# Patient Record
Sex: MALE | Race: WHITE | NOT HISPANIC OR LATINO | URBAN - METROPOLITAN AREA
[De-identification: names, ages, dates, MRNs, and addresses within clinical notes are randomized per-mention and may not be internally consistent; named-entity substitution may affect disease eponyms.]

---

## 2017-06-22 ENCOUNTER — EMERGENCY (EMERGENCY)
Facility: HOSPITAL | Age: 82
LOS: 1 days | Discharge: HOME | End: 2017-06-22
Attending: EMERGENCY MEDICINE
Payer: MEDICARE

## 2017-06-22 VITALS
DIASTOLIC BLOOD PRESSURE: 100 MMHG | HEIGHT: 67 IN | WEIGHT: 145.06 LBS | RESPIRATION RATE: 18 BRPM | OXYGEN SATURATION: 96 % | HEART RATE: 105 BPM | SYSTOLIC BLOOD PRESSURE: 171 MMHG | TEMPERATURE: 98 F

## 2017-06-22 VITALS
DIASTOLIC BLOOD PRESSURE: 95 MMHG | HEART RATE: 105 BPM | SYSTOLIC BLOOD PRESSURE: 149 MMHG | RESPIRATION RATE: 17 BRPM | TEMPERATURE: 98 F | OXYGEN SATURATION: 98 %

## 2017-06-22 DIAGNOSIS — D64.9 ANEMIA, UNSPECIFIED: ICD-10-CM

## 2017-06-22 DIAGNOSIS — I69.351 HEMIPLEGIA AND HEMIPARESIS FOLLOWING CEREBRAL INFARCTION AFFECTING RIGHT DOMINANT SIDE: ICD-10-CM

## 2017-06-22 DIAGNOSIS — Z87.891 PERSONAL HISTORY OF NICOTINE DEPENDENCE: ICD-10-CM

## 2017-06-22 DIAGNOSIS — S51.011A LACERATION WITHOUT FOREIGN BODY OF RIGHT ELBOW, INITIAL ENCOUNTER: ICD-10-CM

## 2017-06-22 DIAGNOSIS — Z79.01 LONG TERM (CURRENT) USE OF ANTICOAGULANTS: ICD-10-CM
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
## 2017-06-22 DIAGNOSIS — I69.320 APHASIA FOLLOWING CEREBRAL INFARCTION: ICD-10-CM
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
## 2017-06-22 LAB
ALBUMIN SERPL ELPH-MCNC: 3 G/DL — LOW (ref 3.5–5)
ALP SERPL-CCNC: 69 U/L — SIGNIFICANT CHANGE UP (ref 40–120)
ALT FLD-CCNC: 18 U/L DA — SIGNIFICANT CHANGE UP (ref 10–60)
ANION GAP SERPL CALC-SCNC: 4 MMOL/L — LOW (ref 5–17)
APTT BLD: 46.6 SEC — HIGH (ref 27.5–37.4)
AST SERPL-CCNC: 17 U/L — SIGNIFICANT CHANGE UP (ref 10–40)
BASOPHILS # BLD AUTO: 0 K/UL — SIGNIFICANT CHANGE UP (ref 0–0.2)
BASOPHILS NFR BLD AUTO: 0.8 % — SIGNIFICANT CHANGE UP (ref 0–2)
BILIRUB SERPL-MCNC: 0.4 MG/DL — SIGNIFICANT CHANGE UP (ref 0.2–1.2)
BUN SERPL-MCNC: 26 MG/DL — HIGH (ref 7–18)
CALCIUM SERPL-MCNC: 8.6 MG/DL — SIGNIFICANT CHANGE UP (ref 8.4–10.5)
CHLORIDE SERPL-SCNC: 102 MMOL/L — SIGNIFICANT CHANGE UP (ref 96–108)
CO2 SERPL-SCNC: 30 MMOL/L — SIGNIFICANT CHANGE UP (ref 22–31)
CREAT SERPL-MCNC: 1.81 MG/DL — HIGH (ref 0.5–1.3)
EOSINOPHIL # BLD AUTO: 0.2 K/UL — SIGNIFICANT CHANGE UP (ref 0–0.5)
EOSINOPHIL NFR BLD AUTO: 2.9 % — SIGNIFICANT CHANGE UP (ref 0–6)
GLUCOSE SERPL-MCNC: 219 MG/DL — HIGH (ref 70–99)
HCT VFR BLD CALC: 31 % — LOW (ref 39–50)
HGB BLD-MCNC: 10.2 G/DL — LOW (ref 13–17)
INR BLD: 3.52 RATIO — HIGH (ref 0.88–1.16)
LYMPHOCYTES # BLD AUTO: 1 K/UL — SIGNIFICANT CHANGE UP (ref 1–3.3)
LYMPHOCYTES # BLD AUTO: 16.5 % — SIGNIFICANT CHANGE UP (ref 13–44)
MCHC RBC-ENTMCNC: 28.2 PG — SIGNIFICANT CHANGE UP (ref 27–34)
MCHC RBC-ENTMCNC: 32.8 GM/DL — SIGNIFICANT CHANGE UP (ref 32–36)
MCV RBC AUTO: 86 FL — SIGNIFICANT CHANGE UP (ref 80–100)
MONOCYTES # BLD AUTO: 0.5 K/UL — SIGNIFICANT CHANGE UP (ref 0–0.9)
MONOCYTES NFR BLD AUTO: 8.5 % — SIGNIFICANT CHANGE UP (ref 2–14)
NEUTROPHILS # BLD AUTO: 4.2 K/UL — SIGNIFICANT CHANGE UP (ref 1.8–7.4)
NEUTROPHILS NFR BLD AUTO: 71.3 % — SIGNIFICANT CHANGE UP (ref 43–77)
PLATELET # BLD AUTO: 175 K/UL — SIGNIFICANT CHANGE UP (ref 150–400)
POTASSIUM SERPL-MCNC: 4.4 MMOL/L — SIGNIFICANT CHANGE UP (ref 3.5–5.3)
POTASSIUM SERPL-SCNC: 4.4 MMOL/L — SIGNIFICANT CHANGE UP (ref 3.5–5.3)
PROT SERPL-MCNC: 6.5 G/DL — SIGNIFICANT CHANGE UP (ref 6–8.3)
PROTHROM AB SERPL-ACNC: 39.4 SEC — HIGH (ref 9.8–12.7)
RBC # BLD: 3.6 M/UL — LOW (ref 4.2–5.8)
RBC # FLD: 12.8 % — SIGNIFICANT CHANGE UP (ref 10.3–14.5)
SODIUM SERPL-SCNC: 136 MMOL/L — SIGNIFICANT CHANGE UP (ref 135–145)
WBC # BLD: 5.9 K/UL — SIGNIFICANT CHANGE UP (ref 3.8–10.5)
WBC # FLD AUTO: 5.9 K/UL — SIGNIFICANT CHANGE UP (ref 3.8–10.5)

## 2017-06-22 PROCEDURE — 99283 EMERGENCY DEPT VISIT LOW MDM: CPT

## 2017-06-22 PROCEDURE — 36415 COLL VENOUS BLD VENIPUNCTURE: CPT

## 2017-06-22 PROCEDURE — 85730 THROMBOPLASTIN TIME PARTIAL: CPT

## 2017-06-22 PROCEDURE — 85610 PROTHROMBIN TIME: CPT

## 2017-06-22 PROCEDURE — 85027 COMPLETE CBC AUTOMATED: CPT

## 2017-06-22 PROCEDURE — 80053 COMPREHEN METABOLIC PANEL: CPT

## 2017-06-22 NOTE — ED PROVIDER NOTE - MEDICAL DECISION MAKING DETAILS
81m pmhx CVA with R sided weakness, aphasia p/w skin tear during OT session when was grabbed by the arm. pt sustained skin tear to distal R biceps, bleeding profusely due to coumadin. was >3 3 days ago, skipped 2 doses, resumed today. pt non-verbal but family at bedside states pt prone to skin tears, no other medical complaints. on PE, midlly tachycardic, hypertensive, RRR, CTA b/l, 1" dog ear skin tear on R distal bicep, not amenable to suture, slow ooze. will obtain basics with coags, pressure dressing with xeroform

## 2017-06-22 NOTE — ED PROVIDER NOTE - PHYSICAL EXAMINATION
PE: CONSTITUTIONAL: Well appearing, well nourished, in no apparent distress. ENMT: Airway patent, nasal mucosa clear, mouth with normal mucosa. HEAD: NCAT EYES: PERRL, EOMI CARDIAC: RRR, no m/r/g, no pedal edema RESPIRATORY: CTA b/l, no adventitious sounds GI: Abdomen non-distended, non-tender MSK: Spine appears normal, range of motion is not limited, no muscle/joint tenderness NEURO: CNII-XII grossly intact, 1-2/5 strength RUE/RLE, full sensation all extremities, gait not assessed SKIN: 1" dog ear skin tear on R distal bicep, not amenable to suture, slow ooze

## 2017-06-22 NOTE — ED PROVIDER NOTE - OBJECTIVE STATEMENT
81m pmhx CVA with R sided weakness, aphasia p/w skin tear during OT session when was grabbed by the arm. pt sustained skin tear to distal R biceps, bleeding profusely due to coumadin. was >3 3 days ago, skipped 2 doses, resumed today. pt non-verbal but family at bedside states pt prone to skin tears, no other medical complaints.

## 2017-06-22 NOTE — ED PROVIDER NOTE - PROGRESS NOTE DETAILS
pt slightly anemia, INR 3.5. family made aware, recommen pt skips today's dose of coumadin. will d/c home via ambulette. printed results provided.

## 2019-05-29 ENCOUNTER — INPATIENT (INPATIENT)
Facility: HOSPITAL | Age: 84
LOS: 1 days | Discharge: HOME | DRG: 101 | End: 2019-05-31
Attending: INTERNAL MEDICINE | Admitting: INTERNAL MEDICINE
Payer: MEDICARE

## 2019-05-29 VITALS
RESPIRATION RATE: 19 BRPM | DIASTOLIC BLOOD PRESSURE: 72 MMHG | TEMPERATURE: 98 F | HEART RATE: 54 BPM | OXYGEN SATURATION: 96 % | SYSTOLIC BLOOD PRESSURE: 137 MMHG

## 2019-05-29 DIAGNOSIS — R56.9 UNSPECIFIED CONVULSIONS: ICD-10-CM

## 2019-05-29 DIAGNOSIS — Z95.2 PRESENCE OF PROSTHETIC HEART VALVE: ICD-10-CM

## 2019-05-29 DIAGNOSIS — R55 SYNCOPE AND COLLAPSE: ICD-10-CM

## 2019-05-29 DIAGNOSIS — N18.9 CHRONIC KIDNEY DISEASE, UNSPECIFIED: ICD-10-CM

## 2019-05-29 DIAGNOSIS — N40.0 BENIGN PROSTATIC HYPERPLASIA WITHOUT LOWER URINARY TRACT SYMPTOMS: ICD-10-CM

## 2019-05-29 DIAGNOSIS — E78.5 HYPERLIPIDEMIA, UNSPECIFIED: ICD-10-CM

## 2019-05-29 DIAGNOSIS — Z29.9 ENCOUNTER FOR PROPHYLACTIC MEASURES, UNSPECIFIED: ICD-10-CM

## 2019-05-29 DIAGNOSIS — R00.1 BRADYCARDIA, UNSPECIFIED: ICD-10-CM

## 2019-05-29 DIAGNOSIS — I63.9 CEREBRAL INFARCTION, UNSPECIFIED: ICD-10-CM

## 2019-05-29 DIAGNOSIS — E11.65 TYPE 2 DIABETES MELLITUS WITH HYPERGLYCEMIA: ICD-10-CM

## 2019-05-29 LAB
ALBUMIN SERPL ELPH-MCNC: 3.3 G/DL — LOW (ref 3.5–5)
ALP SERPL-CCNC: 59 U/L — SIGNIFICANT CHANGE UP (ref 40–120)
ALT FLD-CCNC: 20 U/L DA — SIGNIFICANT CHANGE UP (ref 10–60)
ANION GAP SERPL CALC-SCNC: 8 MMOL/L — SIGNIFICANT CHANGE UP (ref 5–17)
AST SERPL-CCNC: 19 U/L — SIGNIFICANT CHANGE UP (ref 10–40)
BASOPHILS # BLD AUTO: 0.03 K/UL — SIGNIFICANT CHANGE UP (ref 0–0.2)
BASOPHILS NFR BLD AUTO: 0.5 % — SIGNIFICANT CHANGE UP (ref 0–2)
BILIRUB SERPL-MCNC: 0.3 MG/DL — SIGNIFICANT CHANGE UP (ref 0.2–1.2)
BUN SERPL-MCNC: 57 MG/DL — HIGH (ref 7–18)
CALCIUM SERPL-MCNC: 8.6 MG/DL — SIGNIFICANT CHANGE UP (ref 8.4–10.5)
CHLORIDE SERPL-SCNC: 104 MMOL/L — SIGNIFICANT CHANGE UP (ref 96–108)
CO2 SERPL-SCNC: 23 MMOL/L — SIGNIFICANT CHANGE UP (ref 22–31)
CREAT SERPL-MCNC: 2.76 MG/DL — HIGH (ref 0.5–1.3)
EOSINOPHIL # BLD AUTO: 0.18 K/UL — SIGNIFICANT CHANGE UP (ref 0–0.5)
EOSINOPHIL NFR BLD AUTO: 2.8 % — SIGNIFICANT CHANGE UP (ref 0–6)
GLUCOSE SERPL-MCNC: 286 MG/DL — HIGH (ref 70–99)
HCT VFR BLD CALC: 36.6 % — LOW (ref 39–50)
HGB BLD-MCNC: 11.4 G/DL — LOW (ref 13–17)
IMM GRANULOCYTES NFR BLD AUTO: 0.3 % — SIGNIFICANT CHANGE UP (ref 0–1.5)
LACTATE SERPL-SCNC: 1.8 MMOL/L — SIGNIFICANT CHANGE UP (ref 0.7–2)
LYMPHOCYTES # BLD AUTO: 1.36 K/UL — SIGNIFICANT CHANGE UP (ref 1–3.3)
LYMPHOCYTES # BLD AUTO: 21.4 % — SIGNIFICANT CHANGE UP (ref 13–44)
MCHC RBC-ENTMCNC: 27.3 PG — SIGNIFICANT CHANGE UP (ref 27–34)
MCHC RBC-ENTMCNC: 31.1 GM/DL — LOW (ref 32–36)
MCV RBC AUTO: 87.6 FL — SIGNIFICANT CHANGE UP (ref 80–100)
MONOCYTES # BLD AUTO: 0.79 K/UL — SIGNIFICANT CHANGE UP (ref 0–0.9)
MONOCYTES NFR BLD AUTO: 12.4 % — SIGNIFICANT CHANGE UP (ref 2–14)
NEUTROPHILS # BLD AUTO: 3.98 K/UL — SIGNIFICANT CHANGE UP (ref 1.8–7.4)
NEUTROPHILS NFR BLD AUTO: 62.6 % — SIGNIFICANT CHANGE UP (ref 43–77)
NRBC # BLD: 0 /100 WBCS — SIGNIFICANT CHANGE UP (ref 0–0)
PLATELET # BLD AUTO: 182 K/UL — SIGNIFICANT CHANGE UP (ref 150–400)
POTASSIUM SERPL-MCNC: 4.9 MMOL/L — SIGNIFICANT CHANGE UP (ref 3.5–5.3)
POTASSIUM SERPL-SCNC: 4.9 MMOL/L — SIGNIFICANT CHANGE UP (ref 3.5–5.3)
PROT SERPL-MCNC: 7.6 G/DL — SIGNIFICANT CHANGE UP (ref 6–8.3)
RBC # BLD: 4.18 M/UL — LOW (ref 4.2–5.8)
RBC # FLD: 13 % — SIGNIFICANT CHANGE UP (ref 10.3–14.5)
SODIUM SERPL-SCNC: 135 MMOL/L — SIGNIFICANT CHANGE UP (ref 135–145)
T4 FREE+ TSH PNL SERPL: 1.21 UU/ML — SIGNIFICANT CHANGE UP (ref 0.34–4.82)
TROPONIN I SERPL-MCNC: <0.015 NG/ML — SIGNIFICANT CHANGE UP (ref 0–0.04)
WBC # BLD: 6.36 K/UL — SIGNIFICANT CHANGE UP (ref 3.8–10.5)
WBC # FLD AUTO: 6.36 K/UL — SIGNIFICANT CHANGE UP (ref 3.8–10.5)

## 2019-05-29 PROCEDURE — 99285 EMERGENCY DEPT VISIT HI MDM: CPT

## 2019-05-29 PROCEDURE — 93010 ELECTROCARDIOGRAM REPORT: CPT

## 2019-05-29 RX ORDER — SODIUM CHLORIDE 9 MG/ML
500 INJECTION INTRAMUSCULAR; INTRAVENOUS; SUBCUTANEOUS ONCE
Refills: 0 | Status: COMPLETED | OUTPATIENT
Start: 2019-05-29 | End: 2019-05-29

## 2019-05-29 RX ORDER — TAMSULOSIN HYDROCHLORIDE 0.4 MG/1
0.4 CAPSULE ORAL AT BEDTIME
Refills: 0 | Status: DISCONTINUED | OUTPATIENT
Start: 2019-05-29 | End: 2019-05-31

## 2019-05-29 RX ORDER — FINASTERIDE 5 MG/1
5 TABLET, FILM COATED ORAL DAILY
Refills: 0 | Status: DISCONTINUED | OUTPATIENT
Start: 2019-05-29 | End: 2019-05-31

## 2019-05-29 RX ORDER — ATORVASTATIN CALCIUM 80 MG/1
40 TABLET, FILM COATED ORAL AT BEDTIME
Refills: 0 | Status: DISCONTINUED | OUTPATIENT
Start: 2019-05-29 | End: 2019-05-31

## 2019-05-29 RX ORDER — ASPIRIN/CALCIUM CARB/MAGNESIUM 324 MG
81 TABLET ORAL DAILY
Refills: 0 | Status: DISCONTINUED | OUTPATIENT
Start: 2019-05-29 | End: 2019-05-31

## 2019-05-29 RX ORDER — FENOFIBRATE,MICRONIZED 130 MG
48 CAPSULE ORAL DAILY
Refills: 0 | Status: DISCONTINUED | OUTPATIENT
Start: 2019-05-29 | End: 2019-05-31

## 2019-05-29 RX ORDER — LEVETIRACETAM 250 MG/1
250 TABLET, FILM COATED ORAL
Refills: 0 | Status: DISCONTINUED | OUTPATIENT
Start: 2019-05-29 | End: 2019-05-30

## 2019-05-29 RX ORDER — INSULIN LISPRO 100/ML
VIAL (ML) SUBCUTANEOUS
Refills: 0 | Status: DISCONTINUED | OUTPATIENT
Start: 2019-05-29 | End: 2019-05-31

## 2019-05-29 RX ORDER — INSULIN GLARGINE 100 [IU]/ML
10 INJECTION, SOLUTION SUBCUTANEOUS AT BEDTIME
Refills: 0 | Status: DISCONTINUED | OUTPATIENT
Start: 2019-05-29 | End: 2019-05-31

## 2019-05-29 RX ADMIN — SODIUM CHLORIDE 500 MILLILITER(S): 9 INJECTION INTRAMUSCULAR; INTRAVENOUS; SUBCUTANEOUS at 22:45

## 2019-05-29 RX ADMIN — SODIUM CHLORIDE 500 MILLILITER(S): 9 INJECTION INTRAMUSCULAR; INTRAVENOUS; SUBCUTANEOUS at 21:00

## 2019-05-29 NOTE — ED ADULT TRIAGE NOTE - CHIEF COMPLAINT QUOTE
biba for AMS ,beacame unresponsive as per wife during the physical therapy,pt was drooling, BP was low on the 80"s and kalia as per ems,,pt awake during triage pt .non verbal had cva 2 x

## 2019-05-29 NOTE — H&P ADULT - PROBLEM SELECTOR PLAN 1
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  Pt is on Keppra 125mg bid at home  Resume home dose and Check Keppra levels  Consulted Dr Donovan 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

## 2019-05-29 NOTE — H&P ADULT - PROBLEM SELECTOR PLAN 7
Pt takes Warfarin 4mg for 5 days and 2.5 mg two times per week alternating   Monitor INR  His PMD asked him to hold dose for few days due to neurotherapeutic levels

## 2019-05-29 NOTE — H&P ADULT - PROBLEM SELECTOR PLAN 2
EKG showed sinus kalia with 1 st degree block  Normal cardiac enzymes  Tele monitoring for r/o underlying arrythmia causing syncope  Ordered TTE

## 2019-05-29 NOTE — ED PROVIDER NOTE - CLINICAL SUMMARY MEDICAL DECISION MAKING FREE TEXT BOX
83 yro ld male with hx of CVA x2 right sided paralysis, aphasia, DM, seizure on keppra, CAD stent and open heart bypass presents to ed via ems with family for syncope this evening.  pt was sitting and and usually fall asleep but wife noticed pt drooling and unresponsive for about 5 mins called ems and had low BP and HR.  no change in skin color, no seizure like activity, no trauma. no fever,.  pt was at baseline prior to event.  pt normally is incontinent, compliant with meds.  family noticed today sugars fluctuating high as 400 even with meds.      syncope r/o arrhythmia vs uti vs vasovagal vs breakthrough seizure vs cva.  labs, ekg, cxr, ct head, fluids, admit

## 2019-05-29 NOTE — H&P ADULT - ASSESSMENT
83 Male with PMH of Dementia, CKD, CVA (2015 and 2017), Residual Rt body paralysis, Dysarthria, Wheelchair bound, Seizure disorder, Carotid endarterectomy (2017), Aortic valve replacement (Warfarin) and CABG (2013) came to hospital for unresponsiveness.

## 2019-05-29 NOTE — H&P ADULT - NSICDXPASTMEDICALHX_GEN_ALL_CORE_FT
PAST MEDICAL HISTORY:  BPH (benign prostatic hyperplasia)     CVA (cerebrovascular accident)     Diabetes     HLD (hyperlipidemia)     Seizure

## 2019-05-29 NOTE — H&P ADULT - HISTORY OF PRESENT ILLNESS
83 Male with PMH of Dementia, CKD, CVA (2015 and 2017), Residual Rt body paralysis, Wheelchair bound, Seizure, Carotid endarterectomy (2017), Aortic valve replacement (Warfarin) and CABG (2013) came to hospital for unresponsiveness for 10 mins at home. 83 Male with PMH of Dementia, CKD, CVA (2015 and 2017), Residual Rt body paralysis, Dysarthria, Wheelchair bound, Seizure disorder, Carotid endarterectomy (2017), Aortic valve replacement (Warfarin) and CABG (2013) came to hospital for unresponsiveness for 10 mins at home. Pt has started new insulin regimen yesterday for uncontrolled blood sugars of 200-400. In morning he took 10 units of lantus as prescribed by his PMD. In afternoon, is sugar was 400. After dinner, he was watching TV with family when he dropped his head down and started drooling. He was totally unresponsive. When EMS arrived he was confused and was starring on the wall. Upon arrival in ED, he got back to his baseline and started smiling and talking as before. No fever, new focal weakness, chest pain. Last bowel movement was 2 days ago.     SH: Wheel chair. Has 4 hours of HHA for 4 days a week. Lives with wife.  GOC: Ok for resuscitation but No mechanical ventilation.     ED Course: EKG showed bradycardia of 50 with 1st degree block. Labs showed CKD. Cardiac enzymes were normal. Ct head showed Encephalomalacia and Gliosis in left frontal lobe due to old CVA. 83 Male with PMH of Dementia, CKD, CVA (2015 and 2017), Residual Rt body paralysis, Dysarthria, Wheelchair bound, Seizure disorder, Carotid endarterectomy (2017), Aortic valve replacement (Warfarin) and CABG (2013) came to hospital for unresponsiveness for 10 mins at home. Pt has started new insulin regimen yesterday for uncontrolled blood sugars of 200-400. In morning he took 10 units of lantus as prescribed by his PMD. In afternoon, is sugar was 400. After dinner, he was watching TV with family when he dropped his head down and started drooling. He was totally unresponsive and lost urine. When EMS arrived he was confused and was starring on the wall. Upon arrival in ED, he got back to his baseline and started smiling and talking as before. No fever, new focal weakness, chest pain. Last bowel movement was 2 days ago.     SH: Wheel chair. Has 4 hours of HHA for 4 days a week. Lives with wife.  GOC: Ok for resuscitation but No mechanical ventilation.     ED Course: EKG showed bradycardia of 50 with 1st degree block. Labs showed CKD. Cardiac enzymes were normal. Ct head showed Encephalomalacia and Gliosis in left frontal lobe due to old CVA. 83 Male with PMH of Dementia, CKD, CVA (2015 and 2017), Residual Rt body paralysis, Dysarthria, Wheelchair bound, Seizure disorder, Carotid endarterectomy (2017), Aortic valve replacement (Warfarin) and CABG (2013) came to hospital for unresponsiveness for 10 mins at home. Pt has started new insulin regimen yesterday for uncontrolled blood sugars of 200-400. In morning he took 10 units of lantus as prescribed by his PMD. In afternoon, his sugar was 400. After dinner, he was watching TV with family when he dropped his head down and started drooling. He was totally unresponsive and lost urine. When EMS arrived he was confused and was starring on the wall. Upon arrival in ED, he got back to his baseline and started smiling and talking as before. No fever, new focal weakness, chest pain. Last bowel movement was 2 days ago.     SH: Wheel chair. Has 4 hours of HHA for 4 days a week. Lives with wife.  GOC: Ok for resuscitation but No mechanical ventilation.     ED Course: EKG showed bradycardia of 50 with 1st degree block. Labs showed CKD. Cardiac enzymes were normal. Ct head showed Encephalomalacia and Gliosis in left frontal lobe due to old CVA.

## 2019-05-29 NOTE — ED PROVIDER NOTE - PROGRESS NOTE DETAILS
morgan: stable.  pt work up neg.  ct head neg. cxr no acute findings.  admit to med for syncope nos.

## 2019-05-29 NOTE — ED PROVIDER NOTE - OBJECTIVE STATEMENT
83 yro ld male with hx of CVA x2 right sided paralysis, aphasia, DM, seizure on keppra, CAD stent and open heart bypass presents to ed via ems with family for syncope this evening.  pt was sitting and and usually fall asleep but wife noticed pt drooling and unresponsive for about 5 mins called ems and had low BP and HR.  no change in skin color, no seizure like activity, no trauma. no fever,.  pt was at baseline prior to event.  pt normally is incontinent, compliant with meds.  family noticed today sugars fluctuating high as 400 even with meds.  now at baseline in ed

## 2019-05-29 NOTE — H&P ADULT - NSHPPHYSICALEXAM_GEN_ALL_CORE
Vital Signs Last 24 Hrs  T(C): 36.4 (29 May 2019 20:33), Max: 36.4 (29 May 2019 20:33)  T(F): 97.6 (29 May 2019 20:33), Max: 97.6 (29 May 2019 20:33)  HR: 54 (29 May 2019 20:33) (54 - 54)  BP: 137/72 (29 May 2019 20:33) (137/72 - 137/72)  RR: 19 (29 May 2019 20:33) (19 - 19)  SpO2: 96% (29 May 2019 20:33) (96% - 96%)  .  GENERAL: Well developed, Elderly white male, aphasic   HEENT:  Normocephalic/Atraumatic, reactive light reflex, moist mucous membranes  NECK: Supple, no JVD  RESP: Symmetric movement of the chest, clear to auscultation bilaterally  CVS: S1 and S2 audible, no murmur, rubs or gallops noted  GI: Normal active bowel sounds present, abdomen soft, non tender, non distended  EXTREMITIES:  No edema, no clubbing, cyanosis, Lt knee mauricio, Rt arm ecchymosis   MSK: Rt body paralysis  PSYCH: Normal mood, dementia    NEURO: Alert and oriented x 1

## 2019-05-29 NOTE — H&P ADULT - PROBLEM SELECTOR PLAN 8
Unknown baseline Cr but his PMD stopped Januvia due to worsening renal functions   Monitor BMP  Check urine lytes

## 2019-05-30 LAB
ALBUMIN SERPL ELPH-MCNC: 3 G/DL — LOW (ref 3.5–5)
ALP SERPL-CCNC: 54 U/L — SIGNIFICANT CHANGE UP (ref 40–120)
ALT FLD-CCNC: 18 U/L DA — SIGNIFICANT CHANGE UP (ref 10–60)
ANION GAP SERPL CALC-SCNC: 5 MMOL/L — SIGNIFICANT CHANGE UP (ref 5–17)
APTT BLD: 46.3 SEC — HIGH (ref 27.5–36.3)
AST SERPL-CCNC: 16 U/L — SIGNIFICANT CHANGE UP (ref 10–40)
BASOPHILS # BLD AUTO: 0.03 K/UL — SIGNIFICANT CHANGE UP (ref 0–0.2)
BASOPHILS NFR BLD AUTO: 0.6 % — SIGNIFICANT CHANGE UP (ref 0–2)
BILIRUB SERPL-MCNC: 0.2 MG/DL — SIGNIFICANT CHANGE UP (ref 0.2–1.2)
BUN SERPL-MCNC: 53 MG/DL — HIGH (ref 7–18)
CALCIUM SERPL-MCNC: 8.2 MG/DL — LOW (ref 8.4–10.5)
CHLORIDE SERPL-SCNC: 107 MMOL/L — SIGNIFICANT CHANGE UP (ref 96–108)
CHOLEST SERPL-MCNC: 144 MG/DL — SIGNIFICANT CHANGE UP (ref 10–199)
CO2 SERPL-SCNC: 27 MMOL/L — SIGNIFICANT CHANGE UP (ref 22–31)
CREAT SERPL-MCNC: 2.36 MG/DL — HIGH (ref 0.5–1.3)
EOSINOPHIL # BLD AUTO: 0.18 K/UL — SIGNIFICANT CHANGE UP (ref 0–0.5)
EOSINOPHIL NFR BLD AUTO: 3.5 % — SIGNIFICANT CHANGE UP (ref 0–6)
FOLATE SERPL-MCNC: 8.6 NG/ML — SIGNIFICANT CHANGE UP
GLUCOSE BLDC GLUCOMTR-MCNC: 123 MG/DL — HIGH (ref 70–99)
GLUCOSE BLDC GLUCOMTR-MCNC: 196 MG/DL — HIGH (ref 70–99)
GLUCOSE BLDC GLUCOMTR-MCNC: 230 MG/DL — HIGH (ref 70–99)
GLUCOSE BLDC GLUCOMTR-MCNC: 291 MG/DL — HIGH (ref 70–99)
GLUCOSE SERPL-MCNC: 158 MG/DL — HIGH (ref 70–99)
HBA1C BLD-MCNC: 10.6 % — HIGH (ref 4–5.6)
HCT VFR BLD CALC: 32.5 % — LOW (ref 39–50)
HDLC SERPL-MCNC: 54 MG/DL — SIGNIFICANT CHANGE UP
HGB BLD-MCNC: 10.3 G/DL — LOW (ref 13–17)
IMM GRANULOCYTES NFR BLD AUTO: 0.4 % — SIGNIFICANT CHANGE UP (ref 0–1.5)
INR BLD: 3.58 RATIO — HIGH (ref 0.88–1.16)
LIPID PNL WITH DIRECT LDL SERPL: 75 MG/DL — SIGNIFICANT CHANGE UP
LYMPHOCYTES # BLD AUTO: 1.64 K/UL — SIGNIFICANT CHANGE UP (ref 1–3.3)
LYMPHOCYTES # BLD AUTO: 32.3 % — SIGNIFICANT CHANGE UP (ref 13–44)
MAGNESIUM SERPL-MCNC: 2.3 MG/DL — SIGNIFICANT CHANGE UP (ref 1.6–2.6)
MCHC RBC-ENTMCNC: 27.4 PG — SIGNIFICANT CHANGE UP (ref 27–34)
MCHC RBC-ENTMCNC: 31.7 GM/DL — LOW (ref 32–36)
MCV RBC AUTO: 86.4 FL — SIGNIFICANT CHANGE UP (ref 80–100)
MONOCYTES # BLD AUTO: 0.53 K/UL — SIGNIFICANT CHANGE UP (ref 0–0.9)
MONOCYTES NFR BLD AUTO: 10.4 % — SIGNIFICANT CHANGE UP (ref 2–14)
NEUTROPHILS # BLD AUTO: 2.68 K/UL — SIGNIFICANT CHANGE UP (ref 1.8–7.4)
NEUTROPHILS NFR BLD AUTO: 52.8 % — SIGNIFICANT CHANGE UP (ref 43–77)
NRBC # BLD: 0 /100 WBCS — SIGNIFICANT CHANGE UP (ref 0–0)
PHOSPHATE SERPL-MCNC: 3.3 MG/DL — SIGNIFICANT CHANGE UP (ref 2.5–4.5)
PLATELET # BLD AUTO: 174 K/UL — SIGNIFICANT CHANGE UP (ref 150–400)
POTASSIUM SERPL-MCNC: 4.3 MMOL/L — SIGNIFICANT CHANGE UP (ref 3.5–5.3)
POTASSIUM SERPL-SCNC: 4.3 MMOL/L — SIGNIFICANT CHANGE UP (ref 3.5–5.3)
PROT SERPL-MCNC: 6.7 G/DL — SIGNIFICANT CHANGE UP (ref 6–8.3)
PROTHROM AB SERPL-ACNC: 41.3 SEC — HIGH (ref 10–12.9)
RBC # BLD: 3.76 M/UL — LOW (ref 4.2–5.8)
RBC # FLD: 13 % — SIGNIFICANT CHANGE UP (ref 10.3–14.5)
SODIUM SERPL-SCNC: 139 MMOL/L — SIGNIFICANT CHANGE UP (ref 135–145)
TOTAL CHOLESTEROL/HDL RATIO MEASUREMENT: 2.7 RATIO — LOW (ref 3.4–9.6)
TRIGL SERPL-MCNC: 73 MG/DL — SIGNIFICANT CHANGE UP (ref 10–149)
TSH SERPL-MCNC: 0.75 UU/ML — SIGNIFICANT CHANGE UP (ref 0.34–4.82)
VIT B12 SERPL-MCNC: 579 PG/ML — SIGNIFICANT CHANGE UP (ref 232–1245)
WBC # BLD: 5.08 K/UL — SIGNIFICANT CHANGE UP (ref 3.8–10.5)
WBC # FLD AUTO: 5.08 K/UL — SIGNIFICANT CHANGE UP (ref 3.8–10.5)

## 2019-05-30 PROCEDURE — 99223 1ST HOSP IP/OBS HIGH 75: CPT

## 2019-05-30 RX ORDER — SODIUM CHLORIDE 9 MG/ML
1500 INJECTION INTRAMUSCULAR; INTRAVENOUS; SUBCUTANEOUS
Refills: 0 | Status: DISCONTINUED | OUTPATIENT
Start: 2019-05-30 | End: 2019-05-31

## 2019-05-30 RX ORDER — LEVETIRACETAM 250 MG/1
125 TABLET, FILM COATED ORAL
Refills: 0 | Status: DISCONTINUED | OUTPATIENT
Start: 2019-05-30 | End: 2019-05-31

## 2019-05-30 RX ORDER — LEVETIRACETAM 250 MG/1
1000 TABLET, FILM COATED ORAL ONCE
Refills: 0 | Status: COMPLETED | OUTPATIENT
Start: 2019-05-30 | End: 2019-05-30

## 2019-05-30 RX ADMIN — FINASTERIDE 5 MILLIGRAM(S): 5 TABLET, FILM COATED ORAL at 12:35

## 2019-05-30 RX ADMIN — Medication 3: at 17:41

## 2019-05-30 RX ADMIN — Medication 2: at 22:16

## 2019-05-30 RX ADMIN — INSULIN GLARGINE 10 UNIT(S): 100 INJECTION, SOLUTION SUBCUTANEOUS at 22:16

## 2019-05-30 RX ADMIN — ATORVASTATIN CALCIUM 40 MILLIGRAM(S): 80 TABLET, FILM COATED ORAL at 22:16

## 2019-05-30 RX ADMIN — LEVETIRACETAM 400 MILLIGRAM(S): 250 TABLET, FILM COATED ORAL at 01:24

## 2019-05-30 RX ADMIN — Medication 81 MILLIGRAM(S): at 12:35

## 2019-05-30 RX ADMIN — Medication 48 MILLIGRAM(S): at 12:35

## 2019-05-30 RX ADMIN — Medication 1: at 12:35

## 2019-05-30 RX ADMIN — TAMSULOSIN HYDROCHLORIDE 0.4 MILLIGRAM(S): 0.4 CAPSULE ORAL at 22:16

## 2019-05-30 RX ADMIN — LEVETIRACETAM 125 MILLIGRAM(S): 250 TABLET, FILM COATED ORAL at 06:52

## 2019-05-30 RX ADMIN — LEVETIRACETAM 125 MILLIGRAM(S): 250 TABLET, FILM COATED ORAL at 17:41

## 2019-05-30 RX ADMIN — Medication 20 MILLIGRAM(S): at 12:35

## 2019-05-30 NOTE — CONSULT NOTE ADULT - ATTENDING COMMENTS
Patient seen and examined. Examination is notable for expressive aphasia, motor apraxia of the right upper and lower extremities, and at least 2/5 strength throughout right upper and lower extremities. Patient had previous seizure 4 years ago characterized by convulsions, and left hemispheric stroke resulting in right-sided hemiparesis and aphasia - he underwent left CEA following that.    In addition to above plan, patient is approved for MRI Brain to evaluate for new stroke as an explanation for his presenting symptoms, given his stroke risk factors.    After EEG, levetiracetam dosage should be increased to the minimum effective dose of 500mg PO BID.    Patient's wife and son at bedside have been counseled about the differential diagnosis and plan.

## 2019-05-30 NOTE — CONSULT NOTE ADULT - ASSESSMENT
84yo male w/ LOC concerning for seizure    Recommendation:    -EEG to evaluate for seizure    -Continue current Keppra until results of EEG    -Maintain seizure and fall precautions 84yo male w/ LOC concerning for seizure    Recommendation:    -EEG to evaluate for seizure    -Continue current Keppra until results of EEG    -Maintain seizure and fall precautions    -Continue ASA 81mg PO daily & Atorvastatin 40mg QHS for h/o stroke.  Add DVT ppx.

## 2019-05-30 NOTE — PROGRESS NOTE ADULT - SUBJECTIVE AND OBJECTIVE BOX
PGY 1 Note discussed with supervising resident and primary attending    Patient is a 83y old  Male who presents with a chief complaint of Syncope (30 May 2019 10:16)      INTERVAL HPI/OVERNIGHT EVENTS: No acute events overnight, remains afebrile; HD stable, H/H stable, WBC WNL  -Pt reports no new medical problems  -Hb 11->10  -INR remains supra-therapeutic 3.58, will skip Warfarin today again  -Cr 2.76->2.36  -BUN/Cr > 20, likely pre-renal, possibly from dehydration, started pt on trial of hydration  -F/u US renal (CKD)  -F/u XR abdomen (constipation)  -F/u TTE  -F/u PT  -F/u S&S  -F/u EEG per Neuro    MEDICATIONS  (STANDING):  aspirin  chewable 81 milliGRAM(s) Oral daily  atorvastatin 40 milliGRAM(s) Oral at bedtime  fenofibrate Tablet 48 milliGRAM(s) Oral daily  finasteride 5 milliGRAM(s) Oral daily  insulin glargine Injectable (LANTUS) 10 Unit(s) SubCutaneous at bedtime  insulin lispro (HumaLOG) corrective regimen sliding scale   SubCutaneous Before meals and at bedtime  levETIRAcetam 125 milliGRAM(s) Oral two times a day  PARoxetine 20 milliGRAM(s) Oral daily  sodium chloride 0.9%. 1500 milliLiter(s) (60 mL/Hr) IV Continuous <Continuous>  tamsulosin 0.4 milliGRAM(s) Oral at bedtime    MEDICATIONS  (PRN):      __________________________________________________  REVIEW OF SYSTEMS:    Unable to obtain secondary to patient's mentation      Vital Signs Last 24 Hrs  T(C): 36.2 (30 May 2019 05:30), Max: 37 (29 May 2019 22:20)  T(F): 97.1 (30 May 2019 05:30), Max: 98.6 (29 May 2019 22:20)  HR: 60 (30 May 2019 05:30) (54 - 69)  BP: 111/56 (30 May 2019 05:30) (111/56 - 137/72)  BP(mean): --  RR: 18 (30 May 2019 05:30) (18 - 19)  SpO2: 100% (30 May 2019 05:30) (96% - 100%)    ________________________________________________  PHYSICAL EXAM:    GENERAL: NAD  HEENT: Normocephalic;  conjunctivae and sclerae clear; moist mucous membranes;   NECK : supple  CHEST/LUNG: Clear to auscultation bilaterally with good air entry   HEART: S1 S2  regular; no murmurs, gallops or rubs  ABDOMEN: Soft, Nontender, Nondistended; Bowel sounds present  EXTREMITIES: No cyanosis; no edema; no calf tenderness  SKIN: Warm and dry; no rash  NERVOUS SYSTEM:  Awake and alert; no new deficits; residual right sided paralysis s/p CVA in 2017    _________________________________________________  LABS:                        10.3   5.08  )-----------( 174      ( 30 May 2019 06:13 )             32.5     05-30    139  |  107  |  53<H>  ----------------------------<  158<H>  4.3   |  27  |  2.36<H>    Ca    8.2<L>      30 May 2019 06:13  Phos  3.3     05-30  Mg     2.3     05-30    TPro  6.7  /  Alb  3.0<L>  /  TBili  0.2  /  DBili  x   /  AST  16  /  ALT  18  /  AlkPhos  54  05-30    PT/INR - ( 30 May 2019 06:13 )   PT: 41.3 sec;   INR: 3.58 ratio         PTT - ( 30 May 2019 06:13 )  PTT:46.3 sec    CAPILLARY BLOOD GLUCOSE      POCT Blood Glucose.: 196 mg/dL (30 May 2019 12:16)  POCT Blood Glucose.: 123 mg/dL (30 May 2019 07:58)  POCT Blood Glucose.: 318 mg/dL (29 May 2019 20:48)        RADIOLOGY & ADDITIONAL TESTS:    Imaging Personally Reviewed:  YES    Consultant(s) Notes Reviewed:   YES    Care Discussed with Consultants :     Plan of care was discussed with patient and /or primary care giver; all questions and concerns were addressed and care was aligned with patient's wishes.

## 2019-05-30 NOTE — ED ADULT NURSE NOTE - ED STAT RN HANDOFF DETAILS
pt,remained   stable.denies  pain. transfer to rm 521 report given to obdulio segal.not  in distress

## 2019-05-30 NOTE — ED ADULT NURSE NOTE - OBJECTIVE STATEMENT
brought in by ems from home s/p syncope episode pt. fall asleep noted drooling then unresponsive for 5 min.hx of CVA x2 right sided paralysis, aphasia, DM, seizure on keppra, ,cad.  bld.drawn and   sent to lab

## 2019-05-30 NOTE — CONSULT NOTE ADULT - SUBJECTIVE AND OBJECTIVE BOX
+++++++++++++++++++++++NOTE NOT COMPLETED+++++++++++++++++++++++++++++++++++++++++  Patient is a 83y old  Male who presents with a chief complaint of Syncope (29 May 2019 23:29)      HPI:  83 Male with PMH of Dementia, CKD, CVA (2015 and 2017), Residual Rt body paralysis, Dysarthria, Wheelchair bound, Seizure disorder, Carotid endarterectomy (2017), Aortic valve replacement (Warfarin) and CABG (2013) came to hospital for unresponsiveness for 10 mins at home. Pt has started new insulin regimen yesterday for uncontrolled blood sugars of 200-400. In morning he took 10 units of lantus as prescribed by his PMD. In afternoon, his sugar was 400. After dinner, he was watching TV with family when he dropped his head down and started drooling. He was totally unresponsive and lost urine. When EMS arrived he was confused and was starring on the wall. Upon arrival in ED, he got back to his baseline and started smiling and talking as before. No fever, new focal weakness, chest pain. Last bowel movement was 2 days ago.  Son reports pt's nonverbal, wheelchair bound, able to use left arm to feed himself and swallows w/o difficulty.      SH: Wheel chair. Has 4 hours of HHA for 4 days a week. Lives with wife.  GOC: Ok for resuscitation but No mechanical ventilation.     ED Course: EKG showed bradycardia of 50 with 1st degree block. Labs showed CKD. Cardiac enzymes were normal. Ct head showed Encephalomalacia and Gliosis in left frontal lobe due to old CVA. (29 May 2019 23:29)         Neurological Review of Systems:  No difficulty with language.  No vision loss or double vision.  No dizziness, vertigo or new hearing loss.  No difficulty with speech or swallowing.  No focal weakness.  No focal sensory changes.  No numbness or tingling in the bilateral lower extremities.  No difficulty with balance.  No difficulty with ambulation.        MEDICATIONS  (STANDING):  aspirin  chewable 81 milliGRAM(s) Oral daily  atorvastatin 40 milliGRAM(s) Oral at bedtime  fenofibrate Tablet 48 milliGRAM(s) Oral daily  finasteride 5 milliGRAM(s) Oral daily  insulin glargine Injectable (LANTUS) 10 Unit(s) SubCutaneous at bedtime  insulin lispro (HumaLOG) corrective regimen sliding scale   SubCutaneous Before meals and at bedtime  levETIRAcetam 125 milliGRAM(s) Oral two times a day  PARoxetine 20 milliGRAM(s) Oral daily  tamsulosin 0.4 milliGRAM(s) Oral at bedtime    MEDICATIONS  (PRN):    Allergies    No Known Allergies    Intolerances      PAST MEDICAL & SURGICAL HISTORY:  HLD (hyperlipidemia)  Seizure  Diabetes  BPH (benign prostatic hyperplasia)  CVA (cerebrovascular accident)    FAMILY HISTORY:    SOCIAL HISTORY: non smoker/ former smoker/ active smoker    Review of Systems:  Constitutional: No generalized weakness. No fevers or chills.                    Eyes, Ears, Mouth, Throat: No vision loss   Respiratory: No shortness of breath or cough.                                Cardiovascular: No chest pain or palpitations  Gastrointestinal: No nausea or vomiting.                                         Genitourinary: No urinary incontinence or burning on urination.  Musculoskeletal: No joint pain.                                                           Dermatologic: No rash.  Neurological: as per HPI                                                                      Psychiatric: No behavioral problems.  Endocrine: No known hypoglycemia.               Hematologic/Lymphatic: No easy bleeding.    O:  Vital Signs Last 24 Hrs  T(C): 36.2 (30 May 2019 05:30), Max: 37 (29 May 2019 22:20)  T(F): 97.1 (30 May 2019 05:30), Max: 98.6 (29 May 2019 22:20)  HR: 60 (30 May 2019 05:30) (54 - 69)  BP: 111/56 (30 May 2019 05:30) (111/56 - 137/72)  BP(mean): --  RR: 18 (30 May 2019 05:30) (18 - 19)  SpO2: 100% (30 May 2019 05:30) (96% - 100%)    General Exam:   General appearance: No acute distress                 Cardiovascular: Pedal dorsalis pulses intact bilaterally    Mental Status: Orientated to self, date and place.  Attention intact.  No dysarthria, aphasia or neglect.  Knowledge intact.  Registration intact.  Short and long term memory grossly intact.      Cranial Nerves: CN I - not tested.  PERRL, EOMI, VFF, no nystagmus or diplopia.  No APD.  Fundi not visualized.  CN V1-3 intact to light touch and pinprick.  No facial asymmetry.  Hearing intact to finger rub bilaterally.  Tongue, uvula and palate midline.  Sternocleidomastoid and Trapezius intact bilaterally.    Motor:   Tone: normal.                  Strength intact throughout  No pronator drift bilaterally                      No dysmetria on finger-nose-finger or heel-shin-heel  No truncal ataxia.  No resting, postural or action tremor.  No myoclonus.    Sensation: intact to light touch, pinprick, vibration and proprioception    Deep Tendon Reflexes: 1+ bilateral biceps, triceps, brachioradialis, knee and ankle  Toes flexor bilaterally    Gait: normal and stable.  Rhomberg -maile.    Other:     LABS:                        10.3   5.08  )-----------( 174      ( 30 May 2019 06:13 )             32.5     05-30    139  |  107  |  53<H>  ----------------------------<  158<H>  4.3   |  27  |  2.36<H>    Ca    8.2<L>      30 May 2019 06:13  Phos  3.3     05-30  Mg     2.3     05-30    TPro  6.7  /  Alb  3.0<L>  /  TBili  0.2  /  DBili  x   /  AST  16  /  ALT  18  /  AlkPhos  54  05-30    PT/INR - ( 30 May 2019 06:13 )   PT: 41.3 sec;   INR: 3.58 ratio         PTT - ( 30 May 2019 06:13 )  PTT:46.3 sec        RADIOLOGY & ADDITIONAL STUDIES:    EKG:  tele:  TTE:  EEG: +++++++++++++++++++++++NOTE NOT COMPLETED+++++++++++++++++++++++++++++++++++++++++  Patient is a 83y old  Male who presents with a chief complaint of Syncope (29 May 2019 23:29)      HPI:  83 Male with PMH of Dementia, CKD, CVA (2015 and 2017), Residual Rt body paralysis, Dysarthria, Wheelchair bound, Seizure disorder, Carotid endarterectomy (2017), Aortic valve replacement (Warfarin) and CABG (2013) came to hospital for unresponsiveness for 10 mins at home. Pt has started new insulin regimen yesterday for uncontrolled blood sugars of 200-400. In morning he took 10 units of lantus as prescribed by his PMD. In afternoon, his sugar was 400. After dinner, he was watching TV with family when he dropped his head down and started drooling. He was totally unresponsive and lost urine. When EMS arrived he was confused and was starring on the wall. Upon arrival in ED, he got back to his baseline and started smiling and talking as before. No fever, new focal weakness, chest pain. Last bowel movement was 2 days ago.  Son reports pt's nonverbal, wheelchair bound, able to use left arm to feed himself and swallows w/o difficulty.  Communicates by shaking head, or gesturing.  Last night approx 7 or 8PM pt had finished dinner and was sitting in wheelchair watching TV.  Approx 20min had passed after dinner and the pt's family noticed his head was slumped and he as drooling.  Pt was unresponsive for 5-10min until Paramedics arrived and placed an oxygen mask.  Son reports pt had wet himself but pt is incontinent at baseline.  Denies tongue biting or shaking.  Of note, son reports yesterday was the first day pt started using insulin.      SH: Wheel chair. Has 4 hours of HHA for 4 days a week. Lives with wife.  GOC: Ok for resuscitation but No mechanical ventilation.     ED Course: EKG showed bradycardia of 50 with 1st degree block. Labs showed CKD. Cardiac enzymes were normal. Ct head showed Encephalomalacia and Gliosis in left frontal lobe due to old CVA. (29 May 2019 23:29)         Neurological Review of Systems:  (+) Difficulty with language.  No vision loss or double vision.  No dizziness, vertigo or new hearing loss.  (+) Difficulty with speech.  No difficulty w/ swallowing.  (+) Right focal weakness. (+) Right focal sensory changes.  No numbness or tingling in the bilateral lower extremities.  Wheelchair bound.        MEDICATIONS  (STANDING):  aspirin  chewable 81 milliGRAM(s) Oral daily  atorvastatin 40 milliGRAM(s) Oral at bedtime  fenofibrate Tablet 48 milliGRAM(s) Oral daily  finasteride 5 milliGRAM(s) Oral daily  insulin glargine Injectable (LANTUS) 10 Unit(s) SubCutaneous at bedtime  insulin lispro (HumaLOG) corrective regimen sliding scale   SubCutaneous Before meals and at bedtime  levETIRAcetam 125 milliGRAM(s) Oral two times a day  PARoxetine 20 milliGRAM(s) Oral daily  tamsulosin 0.4 milliGRAM(s) Oral at bedtime    MEDICATIONS  (PRN):    Allergies    No Known Allergies    Intolerances      PAST MEDICAL & SURGICAL HISTORY:  HLD (hyperlipidemia)  Seizure  Diabetes  BPH (benign prostatic hyperplasia)  CVA (cerebrovascular accident)    FAMILY HISTORY:    SOCIAL HISTORY: non smoker    Review of Systems:  Constitutional: No generalized weakness. No fevers or chills                  Eyes, Ears, Mouth, Throat: No vision loss   Respiratory: No shortness of breath or cough                                Cardiovascular: No chest pain or palpitations  Gastrointestinal: No nausea or vomiting                                 Genitourinary: No urinary incontinence or burning on urination  Musculoskeletal: No joint pain                                                        Dermatologic: No rash  Neurological: as per HPI                                                                      Psychiatric: No behavioral problems  Endocrine: No known hypoglycemia           Hematologic/Lymphatic: No easy bleeding    O:  Vital Signs Last 24 Hrs  T(C): 36.2 (30 May 2019 05:30), Max: 37 (29 May 2019 22:20)  T(F): 97.1 (30 May 2019 05:30), Max: 98.6 (29 May 2019 22:20)  HR: 60 (30 May 2019 05:30) (54 - 69)  BP: 111/56 (30 May 2019 05:30) (111/56 - 137/72)  RR: 18 (30 May 2019 05:30) (18 - 19)  SpO2: 100% (30 May 2019 05:30) (96% - 100%)    General Exam: Limited exam d/t pt's uncooperation  General appearance: No acute distress                 Cardiovascular: Pedal dorsalis pulses intact bilaterally    Mental Status:  Lethargic. Attention impaired.  Mute.      Cranial Nerves: CN I - not tested.  PERRL, EOMI, VFF, no nystagmus or diplopia.  No APD.  Fundi not visualized.  CN V1-3 intact to light touch and pinprick.  (+) Right facial asymmetry.  Hearing intact to finger rub bilaterally.  Tongue, uvula and palate midline.      Motor:   Tone: Normal and normal bulk                  Strength 0/5 bilateral RUE & RLE.  1/5 in bilateral LUE & LLE  (+) Right pronator drift                      Dysmetria Unable to assess d/t uncooperation  No truncal ataxia.  No resting, postural or action tremor.  No myoclonus.    Sensation: impaired to light touch and pinprick on right.  Intact on left    Deep Tendon Reflexes: 2+ left biceps, triceps, brachioradialis knee and ankle.  3+ right biceps, triceps, brachioradialis and knee.   Toes mute    Gait: Wheelchair nound    Other:   NIHSS=21 (Alertness, LOC, Facial paralysis, Right arm, Right leg, Left arm, Left leg, Sensory deficit, Global aphasia, Severe dysarthria)  MRS=5    LABS:                        10.3   5.08  )-----------( 174      ( 30 May 2019 06:13 )             32.5     05-30    139  |  107  |  53<H>  ----------------------------<  158<H>  4.3   |  27  |  2.36<H>    Ca    8.2<L>      30 May 2019 06:13  Phos  3.3     05-30  Mg     2.3     05-30    TPro  6.7  /  Alb  3.0<L>  /  TBili  0.2  /  DBili  x   /  AST  16  /  ALT  18  /  AlkPhos  54  05-30    PT/INR - ( 30 May 2019 06:13 )   PT: 41.3 sec;   INR: 3.58 ratio         PTT - ( 30 May 2019 06:13 )  PTT:46.3 sec        RADIOLOGY & ADDITIONAL STUDIES:  < from: CT Head No Cont (05.29.19 @ 21:33) >    EXAM:  CT BRAIN                            PROCEDURE DATE:  05/29/2019          INTERPRETATION:      CT head without IV contrast        CLINICAL INFORMATION:  Altered mental status.   Intracranial hemorrhage.    TECHNIQUE: Contiguous axial 5 mm sections were obtained through the head.   Sagittal and coronal 2-D reformatted images were also obtained.   This   scan was performed using automatic exposure control (radiation dose   reduction software) to obtain a diagnostic image quality scan with   patient dose as low as reasonably achievable.     FINDINGS:   No previous examinations are available for review.    The brain demonstrates encephalomalacia and gliosis in the LEFT frontal   and parietal lobes, sequela of old infarction.   No acute cerebral   cortical infarct is seen.  No intracranial hemorrhage is found.  No mass   effect is found in the brain.      The ventricles, sulci and basal cisterns show compensatory enlargement of   the LEFT lateral ventricle.    The orbits are unremarkable.  The paranasal sinuses are clear.  The nasal   cavity appears intact.  The nasopharynx is symmetric.  The central skull   base, petrous temporal bones and calvarium remain intact.      IMPRESSION:   encephalomalacia and gliosis in the LEFT frontaland   parietal lobes, sequela of old infarction. No intracranial hemorrhage is   seen.                  TANNER KRISHNA M.D., ATTENDING RADIOLOGIST  This document has been electronically signed. May 29 2019  9:38PM                < end of copied text > Patient is a 83y old  Male who presents with a chief complaint of Syncope (29 May 2019 23:29)      HPI:  83 Male with PMH of Dementia, CKD, CVA (2015 and 2017), Residual Rt body paralysis, Dysarthria, Wheelchair bound, Seizure disorder, Carotid endarterectomy (2017), Aortic valve replacement (Warfarin) and CABG (2013) came to hospital for unresponsiveness for 10 mins at home. Pt has started new insulin regimen yesterday for uncontrolled blood sugars of 200-400. In morning he took 10 units of lantus as prescribed by his PMD. In afternoon, his sugar was 400. After dinner, he was watching TV with family when he dropped his head down and started drooling. He was totally unresponsive and lost urine. When EMS arrived he was confused and was starring on the wall. Upon arrival in ED, he got back to his baseline and started smiling and talking as before. No fever, new focal weakness, chest pain. Last bowel movement was 2 days ago.  Son reports pt's nonverbal, wheelchair bound, able to use left arm to feed himself and swallows w/o difficulty.  Communicates by shaking head, or gesturing.  Last night approx 7 or 8PM pt had finished dinner and was sitting in wheelchair watching TV.  Approx 20min had passed after dinner and the pt's family noticed his head was slumped and he as drooling.  Pt was unresponsive for 5-10min until Paramedics arrived and placed an oxygen mask.  Son reports pt had wet himself but pt is incontinent at baseline.  Denies tongue biting or shaking.  Of note, son reports yesterday was the first day pt started using insulin.      SH: Wheel chair. Has 4 hours of HHA for 4 days a week. Lives with wife.  GOC: Ok for resuscitation but No mechanical ventilation.     ED Course: EKG showed bradycardia of 50 with 1st degree block. Labs showed CKD. Cardiac enzymes were normal. Ct head showed Encephalomalacia and Gliosis in left frontal lobe due to old CVA. (29 May 2019 23:29)         Neurological Review of Systems:  (+) Difficulty with language.  No vision loss or double vision.  No dizziness, vertigo or new hearing loss.  (+) Difficulty with speech.  No difficulty w/ swallowing.  (+) Right focal weakness. (+) Right focal sensory changes.  No numbness or tingling in the bilateral lower extremities.  Wheelchair bound.        MEDICATIONS  (STANDING):  aspirin  chewable 81 milliGRAM(s) Oral daily  atorvastatin 40 milliGRAM(s) Oral at bedtime  fenofibrate Tablet 48 milliGRAM(s) Oral daily  finasteride 5 milliGRAM(s) Oral daily  insulin glargine Injectable (LANTUS) 10 Unit(s) SubCutaneous at bedtime  insulin lispro (HumaLOG) corrective regimen sliding scale   SubCutaneous Before meals and at bedtime  levETIRAcetam 125 milliGRAM(s) Oral two times a day  PARoxetine 20 milliGRAM(s) Oral daily  tamsulosin 0.4 milliGRAM(s) Oral at bedtime    MEDICATIONS  (PRN):    Allergies    No Known Allergies    Intolerances      PAST MEDICAL & SURGICAL HISTORY:  HLD (hyperlipidemia)  Seizure  Diabetes  BPH (benign prostatic hyperplasia)  CVA (cerebrovascular accident)    FAMILY HISTORY:    SOCIAL HISTORY: non smoker    Review of Systems:  Constitutional: No generalized weakness. No fevers or chills                  Eyes, Ears, Mouth, Throat: No vision loss   Respiratory: No shortness of breath or cough                                Cardiovascular: No chest pain or palpitations  Gastrointestinal: No nausea or vomiting                                 Genitourinary: No urinary incontinence or burning on urination  Musculoskeletal: No joint pain                                                        Dermatologic: No rash  Neurological: as per HPI                                                                      Psychiatric: No behavioral problems  Endocrine: No known hypoglycemia           Hematologic/Lymphatic: No easy bleeding    O:  Vital Signs Last 24 Hrs  T(C): 36.2 (30 May 2019 05:30), Max: 37 (29 May 2019 22:20)  T(F): 97.1 (30 May 2019 05:30), Max: 98.6 (29 May 2019 22:20)  HR: 60 (30 May 2019 05:30) (54 - 69)  BP: 111/56 (30 May 2019 05:30) (111/56 - 137/72)  RR: 18 (30 May 2019 05:30) (18 - 19)  SpO2: 100% (30 May 2019 05:30) (96% - 100%)    General Exam: Limited exam d/t pt's uncooperation  General appearance: No acute distress                 Cardiovascular: Pedal dorsalis pulses intact bilaterally    Mental Status:  Lethargic. Attention impaired.  Mute.      Cranial Nerves: CN I - not tested.  PERRL, EOMI, VFF, no nystagmus or diplopia.  No APD.  Fundi not visualized.  CN V1-3 intact to light touch and pinprick.  (+) Right facial asymmetry.  Hearing intact to finger rub bilaterally.  Tongue, uvula and palate midline.      Motor:   Tone: Normal and normal bulk                  Strength 0/5 bilateral RUE & RLE.  1/5 in bilateral LUE & LLE  (+) Right pronator drift                      Dysmetria Unable to assess d/t uncooperation  No truncal ataxia.  No resting, postural or action tremor.  No myoclonus.    Sensation: impaired to light touch and pinprick on right.  Intact on left    Deep Tendon Reflexes: 2+ left biceps, triceps, brachioradialis knee and ankle.  3+ right biceps, triceps, brachioradialis and knee.   Toes mute    Gait: Wheelchair nound    Other:   NIHSS=21 (Alertness, LOC, Facial paralysis, Right arm, Right leg, Left arm, Left leg, Sensory deficit, Global aphasia, Severe dysarthria)  MRS=5    LABS:                        10.3   5.08  )-----------( 174      ( 30 May 2019 06:13 )             32.5     05-30    139  |  107  |  53<H>  ----------------------------<  158<H>  4.3   |  27  |  2.36<H>    Ca    8.2<L>      30 May 2019 06:13  Phos  3.3     05-30  Mg     2.3     05-30    TPro  6.7  /  Alb  3.0<L>  /  TBili  0.2  /  DBili  x   /  AST  16  /  ALT  18  /  AlkPhos  54  05-30    PT/INR - ( 30 May 2019 06:13 )   PT: 41.3 sec;   INR: 3.58 ratio         PTT - ( 30 May 2019 06:13 )  PTT:46.3 sec        RADIOLOGY & ADDITIONAL STUDIES:  < from: CT Head No Cont (05.29.19 @ 21:33) >    EXAM:  CT BRAIN                            PROCEDURE DATE:  05/29/2019          INTERPRETATION:      CT head without IV contrast        CLINICAL INFORMATION:  Altered mental status.   Intracranial hemorrhage.    TECHNIQUE: Contiguous axial 5 mm sections were obtained through the head.   Sagittal and coronal 2-D reformatted images were also obtained.   This   scan was performed using automatic exposure control (radiation dose   reduction software) to obtain a diagnostic image quality scan with   patient dose as low as reasonably achievable.     FINDINGS:   No previous examinations are available for review.    The brain demonstrates encephalomalacia and gliosis in the LEFT frontal   and parietal lobes, sequela of old infarction.   No acute cerebral   cortical infarct is seen.  No intracranial hemorrhage is found.  No mass   effect is found in the brain.      The ventricles, sulci and basal cisterns show compensatory enlargement of   the LEFT lateral ventricle.    The orbits are unremarkable.  The paranasal sinuses are clear.  The nasal   cavity appears intact.  The nasopharynx is symmetric.  The central skull   base, petrous temporal bones and calvarium remain intact.      IMPRESSION:   encephalomalacia and gliosis in the LEFT frontaland   parietal lobes, sequela of old infarction. No intracranial hemorrhage is   seen.                  TANNER KRISHNA M.D., ATTENDING RADIOLOGIST  This document has been electronically signed. May 29 2019  9:38PM                < end of copied text > Patient is a 83y old  Male who presents with a chief complaint of Syncope (29 May 2019 23:29)      HPI:  83 Male with PMH of Dementia, CKD, CVA (2015 and 2017), Residual Rt body paralysis, Dysarthria, Wheelchair bound, Seizure disorder, Carotid endarterectomy (2017), Aortic valve replacement (Warfarin) and CABG (2013) came to hospital for unresponsiveness for 10 mins at home. Pt has started new insulin regimen yesterday for uncontrolled blood sugars of 200-400. In morning he took 10 units of lantus as prescribed by his PMD. In afternoon, his sugar was 400. After dinner, he was watching TV with family when he dropped his head down and started drooling. He was totally unresponsive and lost urine. When EMS arrived he was confused and was starring on the wall. Upon arrival in ED, he got back to his baseline and started smiling and talking as before. No fever, new focal weakness, chest pain. Last bowel movement was 2 days ago.  Son reports pt's nonverbal, wheelchair bound, able to use left arm to feed himself and swallows w/o difficulty.  Communicates by shaking head, or gesturing.  Last night approx 7 or 8PM pt had finished dinner and was sitting in wheelchair watching TV.  Approx 20min had passed after dinner and the pt's family noticed his head was slumped and he as drooling.  Pt was unresponsive for 5-10min until Paramedics arrived and placed an oxygen mask.  Son reports pt had wet himself but pt is incontinent at baseline.  Denies tongue biting or shaking.  Of note, son reports yesterday was the first day pt started using insulin.      SH: Wheel chair. Has 4 hours of HHA for 4 days a week. Lives with wife.  GOC: Ok for resuscitation but No mechanical ventilation.     ED Course: EKG showed bradycardia of 50 with 1st degree block. Labs showed CKD. Cardiac enzymes were normal. Ct head showed Encephalomalacia and Gliosis in left frontal lobe due to old CVA. (29 May 2019 23:29)         Neurological Review of Systems:  (+) Difficulty with language.  No vision loss or double vision.  No dizziness, vertigo or new hearing loss.  (+) Difficulty with speech.  No difficulty w/ swallowing.  (+) Right focal weakness. (+) Right focal sensory changes.  No numbness or tingling in the bilateral lower extremities.  Wheelchair bound.        MEDICATIONS  (STANDING):  aspirin  chewable 81 milliGRAM(s) Oral daily  atorvastatin 40 milliGRAM(s) Oral at bedtime  fenofibrate Tablet 48 milliGRAM(s) Oral daily  finasteride 5 milliGRAM(s) Oral daily  insulin glargine Injectable (LANTUS) 10 Unit(s) SubCutaneous at bedtime  insulin lispro (HumaLOG) corrective regimen sliding scale   SubCutaneous Before meals and at bedtime  levETIRAcetam 125 milliGRAM(s) Oral two times a day  PARoxetine 20 milliGRAM(s) Oral daily  tamsulosin 0.4 milliGRAM(s) Oral at bedtime    MEDICATIONS  (PRN):    Allergies    No Known Allergies    Intolerances      PAST MEDICAL & SURGICAL HISTORY:  HLD (hyperlipidemia)  Seizure  Diabetes  BPH (benign prostatic hyperplasia)  CVA (cerebrovascular accident)    FAMILY HISTORY:    SOCIAL HISTORY: non smoker    Review of Systems:  Constitutional: No generalized weakness. No fevers or chills                  Eyes, Ears, Mouth, Throat: No vision loss   Respiratory: No shortness of breath or cough                                Cardiovascular: No chest pain or palpitations  Gastrointestinal: No nausea or vomiting                                 Genitourinary: No urinary incontinence or burning on urination  Musculoskeletal: No joint pain                                                        Dermatologic: No rash  Neurological: as per HPI                                                                      Psychiatric: No behavioral problems  Endocrine: No known hypoglycemia           Hematologic/Lymphatic: No easy bleeding    O:  Vital Signs Last 24 Hrs  T(C): 36.2 (30 May 2019 05:30), Max: 37 (29 May 2019 22:20)  T(F): 97.1 (30 May 2019 05:30), Max: 98.6 (29 May 2019 22:20)  HR: 60 (30 May 2019 05:30) (54 - 69)  BP: 111/56 (30 May 2019 05:30) (111/56 - 137/72)  RR: 18 (30 May 2019 05:30) (18 - 19)  SpO2: 100% (30 May 2019 05:30) (96% - 100%)    General Exam: Limited exam d/t pt's uncooperation  General appearance: No acute distress                 Cardiovascular: Pedal dorsalis pulses intact bilaterally    Mental Status:  Lethargic. Attention impaired.  Mute.      Cranial Nerves: CN I - not tested.  PERRL, EOMI, VFF, no nystagmus or diplopia.  No APD.  Fundi not visualized.  CN V1-3 intact to light touch and pinprick.  (+) Right facial asymmetry.  Hearing intact to finger rub bilaterally.  Tongue, uvula and palate midline.      Motor:   Tone: Normal and normal bulk                  Strength 0/5 bilateral RUE & RLE.  1/5 in bilateral LUE & LLE  (+) Right pronator drift                      Dysmetria Unable to assess d/t uncooperation  No truncal ataxia.  No resting, postural or action tremor.  No myoclonus.    Sensation: impaired to light touch and pinprick on right.  Intact on left    Deep Tendon Reflexes: 2+ left biceps, triceps, brachioradialis knee and ankle.  3+ right biceps, triceps, brachioradialis and knee.   Toes mute    Gait: Wheelchair bound    Other:   NIHSS=21 (Alertness, LOC, Facial paralysis, Right arm, Right leg, Left arm, Left leg, Sensory deficit, Global aphasia, Severe dysarthria)  MRS=5    LABS:                        10.3   5.08  )-----------( 174      ( 30 May 2019 06:13 )             32.5     05-30    139  |  107  |  53<H>  ----------------------------<  158<H>  4.3   |  27  |  2.36<H>    Ca    8.2<L>      30 May 2019 06:13  Phos  3.3     05-30  Mg     2.3     05-30    TPro  6.7  /  Alb  3.0<L>  /  TBili  0.2  /  DBili  x   /  AST  16  /  ALT  18  /  AlkPhos  54  05-30    PT/INR - ( 30 May 2019 06:13 )   PT: 41.3 sec;   INR: 3.58 ratio         PTT - ( 30 May 2019 06:13 )  PTT:46.3 sec        RADIOLOGY & ADDITIONAL STUDIES:  < from: CT Head No Cont (05.29.19 @ 21:33) >    EXAM:  CT BRAIN                            PROCEDURE DATE:  05/29/2019          INTERPRETATION:      CT head without IV contrast        CLINICAL INFORMATION:  Altered mental status.   Intracranial hemorrhage.    TECHNIQUE: Contiguous axial 5 mm sections were obtained through the head.   Sagittal and coronal 2-D reformatted images were also obtained.   This   scan was performed using automatic exposure control (radiation dose   reduction software) to obtain a diagnostic image quality scan with   patient dose as low as reasonably achievable.     FINDINGS:   No previous examinations are available for review.    The brain demonstrates encephalomalacia and gliosis in the LEFT frontal   and parietal lobes, sequela of old infarction.   No acute cerebral   cortical infarct is seen.  No intracranial hemorrhage is found.  No mass   effect is found in the brain.      The ventricles, sulci and basal cisterns show compensatory enlargement of   the LEFT lateral ventricle.    The orbits are unremarkable.  The paranasal sinuses are clear.  The nasal   cavity appears intact.  The nasopharynx is symmetric.  The central skull   base, petrous temporal bones and calvarium remain intact.      IMPRESSION:   encephalomalacia and gliosis in the LEFT frontaland   parietal lobes, sequela of old infarction. No intracranial hemorrhage is   seen.                  TANNER KRISHNA M.D., ATTENDING RADIOLOGIST  This document has been electronically signed. May 29 2019  9:38PM                < end of copied text >

## 2019-05-30 NOTE — ED ADULT NURSE NOTE - NSIMPLEMENTINTERV_GEN_ALL_ED
Implemented All Fall Risk Interventions:  [place] to call system. Call bell, personal items and telephone within reach. Instruct patient to call for assistance. Room bathroom lighting operational. Non-slip footwear when patient is off stretcher. Physically safe environment: no spills, clutter or unnecessary equipment. Stretcher in lowest position, wheels locked, appropriate side rails in place. Provide visual cue, wrist band, yellow gown, etc. Monitor gait and stability. Monitor for mental status changes and reorient to person, place, and time. Review medications for side effects contributing to fall risk. Reinforce activity limits and safety measures with patient and family.

## 2019-05-30 NOTE — PATIENT PROFILE ADULT - PACKS YRS CALCULATION
Anesthetic History   No history of anesthetic complications            Review of Systems / Medical History  Patient summary reviewed and pertinent labs reviewed    Pulmonary        Sleep apnea: CPAP        Comments: H/o PE in left lung three years ago   Neuro/Psych   Within defined limits           Cardiovascular    Hypertension        Dysrhythmias : atrial fibrillation      Exercise tolerance: >4 METS     GI/Hepatic/Renal     GERD: well controlled    Renal disease: stones       Endo/Other    Diabetes: type 2, using insulin    Morbid obesity     Other Findings   Comments:   Risk Factors for Postoperative nausea/vomiting:       History of postoperative nausea/vomiting? NO       Female? NO       Motion sickness? NO       Intended opioid administration for postoperative analgesia? YES      Smoking Abstinence  Current Smoker? NO  Elective Surgery? YES  Seen preoperatively by anesthesiologist or proxy prior to day of surgery? YES  Pt abstained from smoking 24 hours prior to anesthesia?  N/A           Physical Exam    Airway  Mallampati: III  TM Distance: 4 - 6 cm  Neck ROM: normal range of motion   Mouth opening: Normal     Cardiovascular  Regular rate and rhythm,  S1 and S2 normal,  no murmur, click, rub, or gallop  Rhythm: regular  Rate: normal         Dental    Dentition: Caps/crowns     Pulmonary  Breath sounds clear to auscultation               Abdominal  GI exam deferred       Other Findings            Anesthetic Plan    ASA: 3  Anesthesia type: general          Induction: Intravenous  Anesthetic plan and risks discussed with: Patient 0

## 2019-05-31 ENCOUNTER — TRANSCRIPTION ENCOUNTER (OUTPATIENT)
Age: 84
End: 2019-05-31

## 2019-05-31 VITALS
TEMPERATURE: 98 F | OXYGEN SATURATION: 98 % | SYSTOLIC BLOOD PRESSURE: 122 MMHG | HEART RATE: 57 BPM | DIASTOLIC BLOOD PRESSURE: 58 MMHG | RESPIRATION RATE: 18 BRPM

## 2019-05-31 LAB
ANION GAP SERPL CALC-SCNC: 5 MMOL/L — SIGNIFICANT CHANGE UP (ref 5–17)
ANION GAP SERPL CALC-SCNC: 6 MMOL/L — SIGNIFICANT CHANGE UP (ref 5–17)
BUN SERPL-MCNC: 41 MG/DL — HIGH (ref 7–18)
BUN SERPL-MCNC: 43 MG/DL — HIGH (ref 7–18)
CALCIUM SERPL-MCNC: 8.2 MG/DL — LOW (ref 8.4–10.5)
CALCIUM SERPL-MCNC: 8.3 MG/DL — LOW (ref 8.4–10.5)
CHLORIDE SERPL-SCNC: 109 MMOL/L — HIGH (ref 96–108)
CHLORIDE SERPL-SCNC: 110 MMOL/L — HIGH (ref 96–108)
CO2 SERPL-SCNC: 24 MMOL/L — SIGNIFICANT CHANGE UP (ref 22–31)
CO2 SERPL-SCNC: 27 MMOL/L — SIGNIFICANT CHANGE UP (ref 22–31)
CREAT SERPL-MCNC: 2.01 MG/DL — HIGH (ref 0.5–1.3)
CREAT SERPL-MCNC: 2.06 MG/DL — HIGH (ref 0.5–1.3)
GLUCOSE BLDC GLUCOMTR-MCNC: 220 MG/DL — HIGH (ref 70–99)
GLUCOSE BLDC GLUCOMTR-MCNC: 257 MG/DL — HIGH (ref 70–99)
GLUCOSE BLDC GLUCOMTR-MCNC: 87 MG/DL — SIGNIFICANT CHANGE UP (ref 70–99)
GLUCOSE SERPL-MCNC: 113 MG/DL — HIGH (ref 70–99)
GLUCOSE SERPL-MCNC: 137 MG/DL — HIGH (ref 70–99)
HCT VFR BLD CALC: 31.6 % — LOW (ref 39–50)
HCT VFR BLD CALC: 32.1 % — LOW (ref 39–50)
HGB BLD-MCNC: 10 G/DL — LOW (ref 13–17)
HGB BLD-MCNC: 9.8 G/DL — LOW (ref 13–17)
INR BLD: 2.19 RATIO — HIGH (ref 0.88–1.16)
LEVETIRACETAM SERPL-MCNC: 21.5 MCG/ML — SIGNIFICANT CHANGE UP (ref 12–46)
MCHC RBC-ENTMCNC: 27.2 PG — SIGNIFICANT CHANGE UP (ref 27–34)
MCHC RBC-ENTMCNC: 27.2 PG — SIGNIFICANT CHANGE UP (ref 27–34)
MCHC RBC-ENTMCNC: 31 GM/DL — LOW (ref 32–36)
MCHC RBC-ENTMCNC: 31.2 GM/DL — LOW (ref 32–36)
MCV RBC AUTO: 87.2 FL — SIGNIFICANT CHANGE UP (ref 80–100)
MCV RBC AUTO: 87.8 FL — SIGNIFICANT CHANGE UP (ref 80–100)
NRBC # BLD: 0 /100 WBCS — SIGNIFICANT CHANGE UP (ref 0–0)
NRBC # BLD: 0 /100 WBCS — SIGNIFICANT CHANGE UP (ref 0–0)
PLATELET # BLD AUTO: 163 K/UL — SIGNIFICANT CHANGE UP (ref 150–400)
PLATELET # BLD AUTO: 165 K/UL — SIGNIFICANT CHANGE UP (ref 150–400)
POTASSIUM SERPL-MCNC: 4.6 MMOL/L — SIGNIFICANT CHANGE UP (ref 3.5–5.3)
POTASSIUM SERPL-MCNC: 4.7 MMOL/L — SIGNIFICANT CHANGE UP (ref 3.5–5.3)
POTASSIUM SERPL-SCNC: 4.6 MMOL/L — SIGNIFICANT CHANGE UP (ref 3.5–5.3)
POTASSIUM SERPL-SCNC: 4.7 MMOL/L — SIGNIFICANT CHANGE UP (ref 3.5–5.3)
PROTHROM AB SERPL-ACNC: 24.9 SEC — HIGH (ref 10–12.9)
RBC # BLD: 3.6 M/UL — LOW (ref 4.2–5.8)
RBC # BLD: 3.68 M/UL — LOW (ref 4.2–5.8)
RBC # FLD: 13.1 % — SIGNIFICANT CHANGE UP (ref 10.3–14.5)
RBC # FLD: 13.2 % — SIGNIFICANT CHANGE UP (ref 10.3–14.5)
SODIUM SERPL-SCNC: 140 MMOL/L — SIGNIFICANT CHANGE UP (ref 135–145)
SODIUM SERPL-SCNC: 141 MMOL/L — SIGNIFICANT CHANGE UP (ref 135–145)
WBC # BLD: 5.36 K/UL — SIGNIFICANT CHANGE UP (ref 3.8–10.5)
WBC # BLD: 5.36 K/UL — SIGNIFICANT CHANGE UP (ref 3.8–10.5)
WBC # FLD AUTO: 5.36 K/UL — SIGNIFICANT CHANGE UP (ref 3.8–10.5)
WBC # FLD AUTO: 5.36 K/UL — SIGNIFICANT CHANGE UP (ref 3.8–10.5)

## 2019-05-31 PROCEDURE — 99285 EMERGENCY DEPT VISIT HI MDM: CPT | Mod: 25

## 2019-05-31 PROCEDURE — 80048 BASIC METABOLIC PNL TOTAL CA: CPT

## 2019-05-31 PROCEDURE — 70450 CT HEAD/BRAIN W/O DYE: CPT

## 2019-05-31 PROCEDURE — 99232 SBSQ HOSP IP/OBS MODERATE 35: CPT

## 2019-05-31 PROCEDURE — 83036 HEMOGLOBIN GLYCOSYLATED A1C: CPT

## 2019-05-31 PROCEDURE — 95819 EEG AWAKE AND ASLEEP: CPT

## 2019-05-31 PROCEDURE — 80053 COMPREHEN METABOLIC PANEL: CPT

## 2019-05-31 PROCEDURE — 83605 ASSAY OF LACTIC ACID: CPT

## 2019-05-31 PROCEDURE — 93306 TTE W/DOPPLER COMPLETE: CPT

## 2019-05-31 PROCEDURE — 76775 US EXAM ABDO BACK WALL LIM: CPT

## 2019-05-31 PROCEDURE — 85027 COMPLETE CBC AUTOMATED: CPT

## 2019-05-31 PROCEDURE — 95957 EEG DIGITAL ANALYSIS: CPT

## 2019-05-31 PROCEDURE — 84100 ASSAY OF PHOSPHORUS: CPT

## 2019-05-31 PROCEDURE — 93005 ELECTROCARDIOGRAM TRACING: CPT

## 2019-05-31 PROCEDURE — 82746 ASSAY OF FOLIC ACID SERUM: CPT

## 2019-05-31 PROCEDURE — 95819 EEG AWAKE AND ASLEEP: CPT | Mod: 26

## 2019-05-31 PROCEDURE — 85730 THROMBOPLASTIN TIME PARTIAL: CPT

## 2019-05-31 PROCEDURE — 84484 ASSAY OF TROPONIN QUANT: CPT

## 2019-05-31 PROCEDURE — 84443 ASSAY THYROID STIM HORMONE: CPT

## 2019-05-31 PROCEDURE — 83735 ASSAY OF MAGNESIUM: CPT

## 2019-05-31 PROCEDURE — 82962 GLUCOSE BLOOD TEST: CPT

## 2019-05-31 PROCEDURE — 85610 PROTHROMBIN TIME: CPT

## 2019-05-31 PROCEDURE — 80177 DRUG SCRN QUAN LEVETIRACETAM: CPT

## 2019-05-31 PROCEDURE — 82607 VITAMIN B-12: CPT

## 2019-05-31 PROCEDURE — 74018 RADEX ABDOMEN 1 VIEW: CPT

## 2019-05-31 PROCEDURE — 80061 LIPID PANEL: CPT

## 2019-05-31 PROCEDURE — 76770 US EXAM ABDO BACK WALL COMP: CPT

## 2019-05-31 PROCEDURE — 71045 X-RAY EXAM CHEST 1 VIEW: CPT

## 2019-05-31 PROCEDURE — 36415 COLL VENOUS BLD VENIPUNCTURE: CPT

## 2019-05-31 RX ORDER — LEVETIRACETAM 250 MG/1
1 TABLET, FILM COATED ORAL
Qty: 60 | Refills: 0
Start: 2019-05-31 | End: 2019-06-29

## 2019-05-31 RX ADMIN — Medication 20 MILLIGRAM(S): at 11:53

## 2019-05-31 RX ADMIN — FINASTERIDE 5 MILLIGRAM(S): 5 TABLET, FILM COATED ORAL at 11:53

## 2019-05-31 RX ADMIN — Medication 81 MILLIGRAM(S): at 11:53

## 2019-05-31 RX ADMIN — Medication 3: at 16:53

## 2019-05-31 RX ADMIN — Medication 48 MILLIGRAM(S): at 11:53

## 2019-05-31 RX ADMIN — Medication 2: at 13:38

## 2019-05-31 RX ADMIN — LEVETIRACETAM 125 MILLIGRAM(S): 250 TABLET, FILM COATED ORAL at 05:24

## 2019-05-31 NOTE — DISCHARGE NOTE NURSING/CASE MANAGEMENT/SOCIAL WORK - NSDCDPATPORTLINK_GEN_ALL_CORE
You can access the [facility] Patient Portal, offered by [facility], by registering with the following website: [email or web address]

## 2019-05-31 NOTE — PROGRESS NOTE ADULT - PROBLEM SELECTOR PLAN 6
C/w Flomax and Proscar  Bladder scan for lower abdominal dullness
C/w Flomax and Proscar  Bladder scan for lower abdominal dullness

## 2019-05-31 NOTE — EEG REPORT - NS EEG TEXT BOX
TO BE ADDED [facility]  Report of Routine EEG     [facility]: [address], [address] [place] [address], Phone: [phone]  [facility]: [id number] [place] [address], Phone: [phone]  Office: [address] [facility], [place] [address], Phone: [phone]    Patient Name: MARIE ROBERTSON    Age: 83 year  : 1935  Patient ID: -, MRN #: [id number], Location: [address]  Referring Physician: DR GOULD  EEG #:     Study Date: 2019		    Technical Information:					  On Instrument: -  Placement and Labeling of Electrodes:  The EEG was performed utilizing 20 channels referential EEG connections (coronal over temporal over parasagittal montage) using all standard 10-20 electrode placements with EKG.  Recording was at a sampling rate of 256 samples per second per channel.  Shawn and seizure detection algorithms were utilized.    History:  Routine study..Performed bedside  Patient awake ..(wife at bedside)  Patient right handedness..(wife said)  HV not performed due to patient state..Photic performed  84Y/O male presents with syncope and collapse  Concern for seizures  Cerebrovascular accident  Aortic valve replaced  Brachycardia  Altered mental status  Reason for this test: Syncope, loss of bowel control,blank starring for 10mn      Medication	  Keppra (Levetiracetam)	    Study Interpretation:    Findings: The background was continuous, spontaneously variable and reactive, but disorganized. During wakefulness, the posterior dominant rhythm consisted of symmetric, well-modulated 8 Hz activity, slightly lower than normal for age, with amplitude to 30 uV, that attenuated to eye opening.  Low amplitude frontal beta was noted in wakefulness.    Background Slowing:  Diffuse theta and polymorphic delta slowing.    Focal Slowing:   None were present.    Sleep Background:  Drowsiness was characterized by fragmentation, attenuation, and slowing of the background activity.    Stage II sleep transients were not recorded.    Other Non-Epileptiform Findings:  None were present.    Interictal Epileptiform Activity:   None were present.    Events:  Clinical events: None recorded.  Seizures: None recorded.    Activation Procedures:   Hyperventilation was performed and did not elicit any abnormalities.    Photic stimulation was performed and did not elicit any abnormalities.      Artifacts:  Intermittent myogenic and movement artifacts were noted.    ECG:  The heart rate on single channel ECG was predominantly between 50 and 60 BPM.    EEG Summary/Classification:  Abnormal EEG in the awake and drowsy states.  - Generalized background slowing  - Disorganization      EEG Impression/Clinical Correlate:    Abnormal EEG study.    Generalized background slowing and disorganization are nonspecific findings that can be seen in the setting of diffuse or multifocal structural abnormalities of the brain, toxic or metabolic encephalopathy, medication side effect, or infection.  No evidence of seizure tendency was identified.  A repeat study preceded by sleep deprivation may be considered to help capture the asleep state.      ________________________________________    Star Gould MD  Attending Physician, [facility]

## 2019-05-31 NOTE — DISCHARGE NOTE PROVIDER - HOSPITAL COURSE
HPI:    83 Male with PMH of Dementia, CKD, CVA (2015 and 2017), Residual Rt body paralysis, Dysarthria, Wheelchair bound, Seizure disorder, Carotid endarterectomy (2017), Aortic valve replacement (Warfarin) and CABG (2013) came to hospital for unresponsiveness for 10 mins at home. Pt has started new insulin regimen yesterday for uncontrolled blood sugars of 200-400. In morning he took 10 units of lantus as prescribed by his PMD. In afternoon, his sugar was 400. After dinner, he was watching TV with family when he dropped his head down and started drooling. He was totally unresponsive and lost urine. When EMS arrived he was confused and was starring on the wall. Upon arrival in ED, he got back to his baseline and started smiling and talking as before. No fever, new focal weakness, chest pain. Last bowel movement was 2 days ago.         SH: Wheel chair. Has 4 hours of HHA for 4 days a week. Lives with wife.    GOC: Ok for resuscitation but No mechanical ventilation.         ED Course: EKG showed bradycardia of 50 with 1st degree block. Labs showed CKD. Cardiac enzymes were normal. Ct head showed Encephalomalacia and Gliosis in left frontal lobe due to old CVA. (29 May 2019 23:29)        Seizure    Patient (pt) presented with loss of consciousness and unresponsive with drooling from mouth and Loss of urine and Post-ictal confusion. Now back to baseline mentation.    Last seizure episode was in 2015. 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 Pt is on Keppra 125mg BID at home. Pt's Keppra dosage increased to 500mg BID given breakthrough seizure on previous regimen. Neurology Dr Han followed the case. EEG performed showed generalized background slowing without active seizure at the time of test. MRI of breain attempted to be performed to rule out new onset stroke, but pt could not be positioned within MRI machine pt's posture.     CT head instead orderd to rule out subacte stroke and it was normal. Pt deemed stable for discharge.         Bradycardia    EKG showed sinus bradycardia with 1st degree block    Normal cardiac enzymes    Tele monitoring for r/o underlying arrythmia causing syncope    Follow up with your cardiologist as outpatient to perform transthoracic echocardiogram given recent syncopal episode.        Aortic valve replaced    Pt takes Warfarin 4mg for 5 days and 2.5 mg two times per week alternating. You initially came with INR 3.58 so Warfarin has been held. INR lowered to subtherapeutic INR level 2.19. Continue with your regular Warfarin regimen. Follow up with your PCP AS SOON AS POSSIBLE AFTER DISCHARGE to set up appointments for INR level check.

## 2019-05-31 NOTE — PROGRESS NOTE ADULT - PROBLEM SELECTOR PLAN 8
Unknown baseline Cr but his PMD stopped Januvia due to worsening renal functions   Monitor BMP  Check urine lytes
Unknown baseline Cr but his PMD stopped Januvia due to worsening renal functions   Monitor BMP  Check urine lytes

## 2019-05-31 NOTE — PROGRESS NOTE ADULT - PROBLEM SELECTOR PLAN 1
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  Pt is on Keppra 125mg bid at home  S/p 1gm loading keppra  C/w home dose and Check Keppra levels until EEG returns  Neurology Dr Han  EEG performed today, pending reading  MR attempted to be performed today to rule out new onset stroke, but pt could not be positioned within MR machine 2/2 pt's posture
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  Pt is on Keppra 125mg bid at home  Ordered 1gm loading Keppra  Resume home dose and Check Keppra levels  Consulted Dr Han  -Hb 11->10  -INR remains supratherapeutic 3.58, will skip Warfarin today again  -Cr 2.76->2.36  -BUN/Cr > 20, likely pre-renal, possibly from dehydration, started pt on trial of hydration  -F/u US renal (CKD)  -F/u XR abdomen (constipation)  -F/u TTE  -F/u PT  -F/u S&S  -F/u EEG per Neuro

## 2019-05-31 NOTE — PROGRESS NOTE ADULT - SUBJECTIVE AND OBJECTIVE BOX
++++++++++++++++++++NOTE NOT COMPLETED++++++++++++++++++++++++++++++++++++++    Neurology Follow up note    Name  MARIE ROBERTSON    Interval History -No neurological events overnight      Subjective:    Review of Systems:  Constitutional: No generalized weakness. No fevers or chills.                    Eyes, Ears, Mouth, Throat: No vision loss   Respiratory: No shortness of breath or cough.                                Cardiovascular: No chest pain or palpitations  Gastrointestinal: No nausea or vomiting.                                         Genitourinary: No urinary incontinence or burning on urination.  Musculoskeletal: No joint pain.                                                           Dermatologic: No rash.  Neurological: as per HPI                                                                      Psychiatric: No behavioral problems.  Endocrine: No known hypoglycemia.               Hematologic/Lymphatic: No easy bleeding.    MEDICATIONS  (STANDING):  aspirin  chewable 81 milliGRAM(s) Oral daily  atorvastatin 40 milliGRAM(s) Oral at bedtime  fenofibrate Tablet 48 milliGRAM(s) Oral daily  finasteride 5 milliGRAM(s) Oral daily  insulin glargine Injectable (LANTUS) 10 Unit(s) SubCutaneous at bedtime  insulin lispro (HumaLOG) corrective regimen sliding scale   SubCutaneous Before meals and at bedtime  levETIRAcetam 125 milliGRAM(s) Oral two times a day  PARoxetine 20 milliGRAM(s) Oral daily  sodium chloride 0.9%. 1500 milliLiter(s) (60 mL/Hr) IV Continuous <Continuous>  tamsulosin 0.4 milliGRAM(s) Oral at bedtime    MEDICATIONS  (PRN):      Allergies    No Known Allergies    Intolerances        Objective:   Vital Signs Last 24 Hrs  T(C): 36.7 (31 May 2019 10:40), Max: 36.8 (31 May 2019 02:39)  T(F): 98 (31 May 2019 10:40), Max: 98.3 (31 May 2019 02:39)  HR: 56 (31 May 2019 10:40) (54 - 66)  BP: 112/54 (31 May 2019 10:40) (104/49 - 143/55)  BP(mean): --  RR: 18 (31 May 2019 10:40) (18 - 18)  SpO2: 98% (31 May 2019 10:40) (94% - 100%)    General Exam:   General appearance: No acute distress                 Cardiovascular: Pedal dorsalis pulses intact bilaterally    Neurological Exam:  Mental Status: Orientated to self, date and place.  Attention intact.  No dysarthria, aphasia or neglect.  Knowledge intact.  Registration intact.  Short and long term memory grossly intact.      Cranial Nerves: CN I - not tested.  PERRL, EOMI, VFF, no nystagmus or diplopia.  No APD.  Fundi not visualized bilaterally.  CN V1-3 intact to light touch and pinprick.  No facial asymmetry.  Hearing intact to finger rub bilaterally.  Tongue, uvula and palate midline.  Sternocleidomastoid and Trapezius intact bilaterally.    Motor:   Tone: normal.                  Strength: intact throughout  Pronator drift: none                 Dysmeria: None to finger-nose-finger or heel-shin-heel  No truncal ataxia.    Tremor: No resting, postural or action tremor.  No myoclonus.    Sensation: intact to light touch, pinprick, vibration and proprioception    Deep Tendon Reflexes: 1+ bilateral biceps, triceps, brachioradialis, knee and ankle  Toes flexor bilaterally    Gait: normal and stable.      Other:    05-31    140  |  110<H>  |  43<H>  ----------------------------<  137<H>  4.6   |  24  |  2.01<H>    Ca    8.3<L>      31 May 2019 10:52  Phos  3.3     05-30  Mg     2.3     05-30    TPro  6.7  /  Alb  3.0<L>  /  TBili  0.2  /  DBili  x   /  AST  16  /  ALT  18  /  AlkPhos  54  05-30    LIVER FUNCTIONS - ( 30 May 2019 06:13 )  Alb: 3.0 g/dL / Pro: 6.7 g/dL / ALK PHOS: 54 U/L / ALT: 18 U/L DA / AST: 16 U/L / GGT: x             Radiology    EKG:  tele:  TTE:  EEG: Neurology Follow up note    Name  MAREI ROBERTSON    Interval History -No neurological events overnight      Subjective:  Wife and son at bedside denies any seizure like activity or LOC.  Report pt slept well and fed himself yesterday.    Review of Systems:  Constitutional: No generalized weakness. No fevers or chills.                    Eyes, Ears, Mouth, Throat: No vision loss   Respiratory: No shortness of breath or cough.                                Cardiovascular: No chest pain or palpitations  Gastrointestinal: No nausea or vomiting.                                         Genitourinary: No urinary incontinence or burning on urination.  Musculoskeletal: No joint pain.                                                           Dermatologic: No rash.  Neurological: as per HPI                                                                      Psychiatric: No behavioral problems.  Endocrine: No known hypoglycemia.               Hematologic/Lymphatic: No easy bleeding.    MEDICATIONS  (STANDING):  aspirin  chewable 81 milliGRAM(s) Oral daily  atorvastatin 40 milliGRAM(s) Oral at bedtime  fenofibrate Tablet 48 milliGRAM(s) Oral daily  finasteride 5 milliGRAM(s) Oral daily  insulin glargine Injectable (LANTUS) 10 Unit(s) SubCutaneous at bedtime  insulin lispro (HumaLOG) corrective regimen sliding scale   SubCutaneous Before meals and at bedtime  levETIRAcetam 125 milliGRAM(s) Oral two times a day  PARoxetine 20 milliGRAM(s) Oral daily  sodium chloride 0.9%. 1500 milliLiter(s) (60 mL/Hr) IV Continuous <Continuous>  tamsulosin 0.4 milliGRAM(s) Oral at bedtime    MEDICATIONS  (PRN):      Allergies    No Known Allergies    Intolerances        Objective:   Vital Signs Last 24 Hrs  T(C): 36.7 (31 May 2019 10:40), Max: 36.8 (31 May 2019 02:39)  T(F): 98 (31 May 2019 10:40), Max: 98.3 (31 May 2019 02:39)  HR: 56 (31 May 2019 10:40) (54 - 66)  BP: 112/54 (31 May 2019 10:40) (104/49 - 143/55)  RR: 18 (31 May 2019 10:40) (18 - 18)  SpO2: 98% (31 May 2019 10:40) (94% - 100%)    General Exam:   Exam unchanged from yesterday    05-31    140  |  110<H>  |  43<H>  ----------------------------<  137<H>  4.6   |  24  |  2.01<H>    Ca    8.3<L>      31 May 2019 10:52  Phos  3.3     05-30  Mg     2.3     05-30    TPro  6.7  /  Alb  3.0<L>  /  TBili  0.2  /  DBili  x   /  AST  16  /  ALT  18  /  AlkPhos  54  05-30    LIVER FUNCTIONS - ( 30 May 2019 06:13 )  Alb: 3.0 g/dL / Pro: 6.7 g/dL / ALK PHOS: 54 U/L / ALT: 18 U/L DA / AST: 16 U/L / GGT: x             Radiology  EEG pending    MR Brain pending

## 2019-05-31 NOTE — PROGRESS NOTE ADULT - ATTENDING COMMENTS
I agree with the above findings, and I personally examined and counseled the patient and his wife at bedside, spending a total of 30 minutes.

## 2019-05-31 NOTE — PROGRESS NOTE ADULT - PROBLEM SELECTOR PLAN 4
Pt takes Glipizide and Lantus 10 at home  Started HSS  F/u HbA1c
Pt takes Glipizide and Lantus 10 at home  Started HSS  F/u HbA1c

## 2019-05-31 NOTE — PROGRESS NOTE ADULT - PROBLEM SELECTOR PLAN 7
Pt takes Warfarin 4mg for 5 days and 2.5 mg two times per week alternating   Monitor INR  His PMD asked him to hold dose for few days due to neurotherapeutic levels
Pt takes Warfarin 4mg for 5 days and 2.5 mg two times per week alternating   Monitor INR  His PMD asked him to hold dose for few days due to neurotherapeutic levels

## 2019-05-31 NOTE — PROGRESS NOTE ADULT - SUBJECTIVE AND OBJECTIVE BOX
PGY 1 Note discussed with supervising resident and primary attending    Patient is a 83y old  Male who presents with a chief complaint of Syncope (31 May 2019 12:07)      INTERVAL HPI/OVERNIGHT EVENTS: No acute events overnight, remains afebrile; HD stable, H/H stable, WBC WNL  -Pt reports no new medical problems  -EEG performed today, pending reading  -MR attempted to be performed today to rule out new onset stroke, but pt could not be positioned within MR machine 2/2 pt's posture    MEDICATIONS  (STANDING):  aspirin  chewable 81 milliGRAM(s) Oral daily  atorvastatin 40 milliGRAM(s) Oral at bedtime  fenofibrate Tablet 48 milliGRAM(s) Oral daily  finasteride 5 milliGRAM(s) Oral daily  insulin glargine Injectable (LANTUS) 10 Unit(s) SubCutaneous at bedtime  insulin lispro (HumaLOG) corrective regimen sliding scale   SubCutaneous Before meals and at bedtime  levETIRAcetam 125 milliGRAM(s) Oral two times a day  PARoxetine 20 milliGRAM(s) Oral daily  sodium chloride 0.9%. 1500 milliLiter(s) (60 mL/Hr) IV Continuous <Continuous>  tamsulosin 0.4 milliGRAM(s) Oral at bedtime    MEDICATIONS  (PRN):      __________________________________________________  REVIEW OF SYSTEMS:    Unable to obtain secondary to patient's mentation      Vital Signs Last 24 Hrs  T(C): 36.7 (31 May 2019 10:40), Max: 36.8 (31 May 2019 02:39)  T(F): 98 (31 May 2019 10:40), Max: 98.3 (31 May 2019 02:39)  HR: 56 (31 May 2019 10:40) (54 - 66)  BP: 112/54 (31 May 2019 10:40) (104/49 - 143/55)  BP(mean): --  RR: 18 (31 May 2019 10:40) (18 - 18)  SpO2: 98% (31 May 2019 10:40) (94% - 100%)    ________________________________________________  PHYSICAL EXAM:    GENERAL: NAD  HEENT: Normocephalic;  conjunctivae and sclerae clear; moist mucous membranes;   NECK : supple  CHEST/LUNG: Clear to auscultation bilaterally with good air entry   HEART: S1 S2  regular; no murmurs, gallops or rubs  ABDOMEN: Soft, Nontender, Nondistended; Bowel sounds present  EXTREMITIES: No cyanosis; no edema; no calf tenderness  SKIN: Warm and dry; no rash  NERVOUS SYSTEM:  Awake and alert; no new deficits; residual right sided paralysis s/p CVA in 2017    _________________________________________________  LABS:                        9.8    5.36  )-----------( 163      ( 31 May 2019 10:52 )             31.6     05-31    140  |  110<H>  |  43<H>  ----------------------------<  137<H>  4.6   |  24  |  2.01<H>    Ca    8.3<L>      31 May 2019 10:52  Phos  3.3     05-30  Mg     2.3     05-30    TPro  6.7  /  Alb  3.0<L>  /  TBili  0.2  /  DBili  x   /  AST  16  /  ALT  18  /  AlkPhos  54  05-30    PT/INR - ( 31 May 2019 10:16 )   PT: 24.9 sec;   INR: 2.19 ratio         PTT - ( 30 May 2019 06:13 )  PTT:46.3 sec    CAPILLARY BLOOD GLUCOSE      POCT Blood Glucose.: 220 mg/dL (31 May 2019 13:30)  POCT Blood Glucose.: 87 mg/dL (31 May 2019 07:55)  POCT Blood Glucose.: 230 mg/dL (30 May 2019 21:25)  POCT Blood Glucose.: 291 mg/dL (30 May 2019 16:55)        RADIOLOGY & ADDITIONAL TESTS:    Imaging Personally Reviewed:  YES    Consultant(s) Notes Reviewed:   YES    Care Discussed with Consultants :     Plan of care was discussed with patient and /or primary care giver; all questions and concerns were addressed and care was aligned with patient's wishes.

## 2019-05-31 NOTE — PROGRESS NOTE ADULT - ASSESSMENT
+++++++++++++++++++++++++++++NOTE NOT COMPLETED++++++++++++++++++++++++++++++ 82yo male w/ LOC concerning for seizure    Recommendation:    -EEG to evaluate for seizure    -Continue current Keppra until results of EEG    -Maintain seizure and fall precautions    -Continue ASA 81mg PO daily & Atorvastatin 40mg QHS for h/o stroke.  Add DVT ppx.    -MR Brain needed to evaluate new stroke given stroke like sxms on admission.

## 2019-05-31 NOTE — PROGRESS NOTE ADULT - PROBLEM SELECTOR PLAN 2
EKG showed sinus kalia with 1 st degree block  Normal cardiac enzymes  Tele monitoring for r/o underlying arrythmia causing syncope  Ordered TTE
EKG showed sinus kalia with 1 st degree block  Normal cardiac enzymes  Tele monitoring for r/o underlying arrythmia causing syncope  Ordered TTE

## 2019-05-31 NOTE — PROGRESS NOTE ADULT - PROBLEM SELECTOR PLAN 3
Residual Rt body paralysis, Dysarthria, Wheelchair bound  C/w ASA and Fenofibrate
Residual Rt body paralysis, Dysarthria, Wheelchair bound  C/w ASA and Fenofibrate

## 2019-06-03 RX ORDER — LEVETIRACETAM 250 MG/1
0.5 TABLET, FILM COATED ORAL
Qty: 0 | Refills: 0 | DISCHARGE

## 2022-11-14 ENCOUNTER — INPATIENT (INPATIENT)
Facility: HOSPITAL | Age: 87
LOS: 4 days | Discharge: HOME | DRG: 699 | End: 2022-11-19
Attending: INTERNAL MEDICINE | Admitting: INTERNAL MEDICINE
Payer: MEDICARE

## 2022-11-14 VITALS
HEART RATE: 75 BPM | HEIGHT: 69 IN | RESPIRATION RATE: 16 BRPM | OXYGEN SATURATION: 96 % | WEIGHT: 164.91 LBS | DIASTOLIC BLOOD PRESSURE: 83 MMHG | SYSTOLIC BLOOD PRESSURE: 185 MMHG

## 2022-11-14 DIAGNOSIS — N18.4 CHRONIC KIDNEY DISEASE, STAGE 4 (SEVERE): ICD-10-CM

## 2022-11-14 DIAGNOSIS — Z95.1 PRESENCE OF AORTOCORONARY BYPASS GRAFT: Chronic | ICD-10-CM

## 2022-11-14 DIAGNOSIS — N40.0 BENIGN PROSTATIC HYPERPLASIA WITHOUT LOWER URINARY TRACT SYMPTOMS: ICD-10-CM

## 2022-11-14 DIAGNOSIS — Z29.9 ENCOUNTER FOR PROPHYLACTIC MEASURES, UNSPECIFIED: ICD-10-CM

## 2022-11-14 DIAGNOSIS — R56.9 UNSPECIFIED CONVULSIONS: ICD-10-CM

## 2022-11-14 DIAGNOSIS — N17.9 ACUTE KIDNEY FAILURE, UNSPECIFIED: ICD-10-CM

## 2022-11-14 DIAGNOSIS — I63.9 CEREBRAL INFARCTION, UNSPECIFIED: ICD-10-CM

## 2022-11-14 DIAGNOSIS — N39.0 URINARY TRACT INFECTION, SITE NOT SPECIFIED: ICD-10-CM

## 2022-11-14 DIAGNOSIS — Z98.890 OTHER SPECIFIED POSTPROCEDURAL STATES: Chronic | ICD-10-CM

## 2022-11-14 DIAGNOSIS — E11.9 TYPE 2 DIABETES MELLITUS WITHOUT COMPLICATIONS: ICD-10-CM

## 2022-11-14 DIAGNOSIS — Z95.2 PRESENCE OF PROSTHETIC HEART VALVE: ICD-10-CM

## 2022-11-14 DIAGNOSIS — E78.5 HYPERLIPIDEMIA, UNSPECIFIED: ICD-10-CM

## 2022-11-14 LAB
ALBUMIN SERPL ELPH-MCNC: 2.9 G/DL — LOW (ref 3.5–5)
ALP SERPL-CCNC: 83 U/L — SIGNIFICANT CHANGE UP (ref 40–120)
ALT FLD-CCNC: 22 U/L DA — SIGNIFICANT CHANGE UP (ref 10–60)
ANION GAP SERPL CALC-SCNC: 6 MMOL/L — SIGNIFICANT CHANGE UP (ref 5–17)
APPEARANCE UR: ABNORMAL
APTT BLD: 38.7 SEC — HIGH (ref 27.5–35.5)
AST SERPL-CCNC: 22 U/L — SIGNIFICANT CHANGE UP (ref 10–40)
BACTERIA # UR AUTO: ABNORMAL /HPF
BASOPHILS # BLD AUTO: 0.01 K/UL — SIGNIFICANT CHANGE UP (ref 0–0.2)
BASOPHILS NFR BLD AUTO: 0.3 % — SIGNIFICANT CHANGE UP (ref 0–2)
BILIRUB SERPL-MCNC: 0.2 MG/DL — SIGNIFICANT CHANGE UP (ref 0.2–1.2)
BILIRUB UR-MCNC: NEGATIVE — SIGNIFICANT CHANGE UP
BUN SERPL-MCNC: 68 MG/DL — HIGH (ref 7–18)
CALCIUM SERPL-MCNC: 9.4 MG/DL — SIGNIFICANT CHANGE UP (ref 8.4–10.5)
CHLORIDE SERPL-SCNC: 110 MMOL/L — HIGH (ref 96–108)
CO2 SERPL-SCNC: 24 MMOL/L — SIGNIFICANT CHANGE UP (ref 22–31)
COLOR SPEC: ABNORMAL
CREAT SERPL-MCNC: 3.36 MG/DL — HIGH (ref 0.5–1.3)
DIFF PNL FLD: ABNORMAL
EGFR: 17 ML/MIN/1.73M2 — LOW
EOSINOPHIL # BLD AUTO: 0.07 K/UL — SIGNIFICANT CHANGE UP (ref 0–0.5)
EOSINOPHIL NFR BLD AUTO: 2.1 % — SIGNIFICANT CHANGE UP (ref 0–6)
EPI CELLS # UR: SIGNIFICANT CHANGE UP /HPF
GLUCOSE BLDC GLUCOMTR-MCNC: 170 MG/DL — HIGH (ref 70–99)
GLUCOSE SERPL-MCNC: 311 MG/DL — HIGH (ref 70–99)
GLUCOSE UR QL: 100 MG/DL
HCT VFR BLD CALC: 31.9 % — LOW (ref 39–50)
HGB BLD-MCNC: 9.9 G/DL — LOW (ref 13–17)
HYALINE CASTS # UR AUTO: ABNORMAL /LPF
IMM GRANULOCYTES NFR BLD AUTO: 0.6 % — SIGNIFICANT CHANGE UP (ref 0–0.9)
INR BLD: 1.46 RATIO — HIGH (ref 0.88–1.16)
KETONES UR-MCNC: ABNORMAL
LACTATE SERPL-SCNC: 1.4 MMOL/L — SIGNIFICANT CHANGE UP (ref 0.7–2)
LEUKOCYTE ESTERASE UR-ACNC: ABNORMAL
LYMPHOCYTES # BLD AUTO: 0.78 K/UL — LOW (ref 1–3.3)
LYMPHOCYTES # BLD AUTO: 23 % — SIGNIFICANT CHANGE UP (ref 13–44)
MCHC RBC-ENTMCNC: 26.9 PG — LOW (ref 27–34)
MCHC RBC-ENTMCNC: 31 GM/DL — LOW (ref 32–36)
MCV RBC AUTO: 86.7 FL — SIGNIFICANT CHANGE UP (ref 80–100)
MONOCYTES # BLD AUTO: 0.03 K/UL — SIGNIFICANT CHANGE UP (ref 0–0.9)
MONOCYTES NFR BLD AUTO: 0.9 % — LOW (ref 2–14)
NEUTROPHILS # BLD AUTO: 2.48 K/UL — SIGNIFICANT CHANGE UP (ref 1.8–7.4)
NEUTROPHILS NFR BLD AUTO: 73.1 % — SIGNIFICANT CHANGE UP (ref 43–77)
NITRITE UR-MCNC: NEGATIVE — SIGNIFICANT CHANGE UP
NRBC # BLD: 0 /100 WBCS — SIGNIFICANT CHANGE UP (ref 0–0)
PH UR: 6.5 — SIGNIFICANT CHANGE UP (ref 5–8)
PLATELET # BLD AUTO: 190 K/UL — SIGNIFICANT CHANGE UP (ref 150–400)
POTASSIUM SERPL-MCNC: 4.3 MMOL/L — SIGNIFICANT CHANGE UP (ref 3.5–5.3)
POTASSIUM SERPL-SCNC: 4.3 MMOL/L — SIGNIFICANT CHANGE UP (ref 3.5–5.3)
PROT SERPL-MCNC: 7.6 G/DL — SIGNIFICANT CHANGE UP (ref 6–8.3)
PROT UR-MCNC: 500 MG/DL
PROTHROM AB SERPL-ACNC: 17.5 SEC — HIGH (ref 10.5–13.4)
RBC # BLD: 3.68 M/UL — LOW (ref 4.2–5.8)
RBC # FLD: 14.6 % — HIGH (ref 10.3–14.5)
RBC CASTS # UR COMP ASSIST: ABNORMAL /HPF (ref 0–2)
SARS-COV-2 RNA SPEC QL NAA+PROBE: SIGNIFICANT CHANGE UP
SODIUM SERPL-SCNC: 140 MMOL/L — SIGNIFICANT CHANGE UP (ref 135–145)
SP GR SPEC: 1.01 — SIGNIFICANT CHANGE UP (ref 1.01–1.02)
UROBILINOGEN FLD QL: NEGATIVE — SIGNIFICANT CHANGE UP
WBC # BLD: 3.39 K/UL — LOW (ref 3.8–10.5)
WBC # FLD AUTO: 3.39 K/UL — LOW (ref 3.8–10.5)
WBC UR QL: ABNORMAL /HPF (ref 0–5)

## 2022-11-14 PROCEDURE — 71045 X-RAY EXAM CHEST 1 VIEW: CPT | Mod: 26

## 2022-11-14 PROCEDURE — 99284 EMERGENCY DEPT VISIT MOD MDM: CPT

## 2022-11-14 PROCEDURE — 93010 ELECTROCARDIOGRAM REPORT: CPT

## 2022-11-14 RX ORDER — INSULIN LISPRO 100/ML
VIAL (ML) SUBCUTANEOUS
Refills: 0 | Status: DISCONTINUED | OUTPATIENT
Start: 2022-11-14 | End: 2022-11-19

## 2022-11-14 RX ORDER — OLANZAPINE 15 MG/1
2.5 TABLET, FILM COATED ORAL ONCE
Refills: 0 | Status: COMPLETED | OUTPATIENT
Start: 2022-11-14 | End: 2022-11-14

## 2022-11-14 RX ORDER — SODIUM CHLORIDE 9 MG/ML
1000 INJECTION INTRAMUSCULAR; INTRAVENOUS; SUBCUTANEOUS
Refills: 0 | Status: COMPLETED | OUTPATIENT
Start: 2022-11-14 | End: 2022-11-15

## 2022-11-14 RX ORDER — WARFARIN SODIUM 2.5 MG/1
2 TABLET ORAL ONCE
Refills: 0 | Status: COMPLETED | OUTPATIENT
Start: 2022-11-14 | End: 2022-11-14

## 2022-11-14 RX ORDER — FENOFIBRATE,MICRONIZED 130 MG
1 CAPSULE ORAL
Qty: 0 | Refills: 0 | DISCHARGE

## 2022-11-14 RX ORDER — CEFTRIAXONE 500 MG/1
1000 INJECTION, POWDER, FOR SOLUTION INTRAMUSCULAR; INTRAVENOUS EVERY 24 HOURS
Refills: 0 | Status: DISCONTINUED | OUTPATIENT
Start: 2022-11-14 | End: 2022-11-16

## 2022-11-14 RX ORDER — LEVETIRACETAM 250 MG/1
500 TABLET, FILM COATED ORAL
Refills: 0 | Status: DISCONTINUED | OUTPATIENT
Start: 2022-11-14 | End: 2022-11-19

## 2022-11-14 RX ORDER — FINASTERIDE 5 MG/1
5 TABLET, FILM COATED ORAL DAILY
Refills: 0 | Status: DISCONTINUED | OUTPATIENT
Start: 2022-11-14 | End: 2022-11-19

## 2022-11-14 RX ORDER — ASPIRIN/CALCIUM CARB/MAGNESIUM 324 MG
1 TABLET ORAL
Qty: 0 | Refills: 0 | DISCHARGE

## 2022-11-14 RX ORDER — ROSUVASTATIN CALCIUM 5 MG/1
1 TABLET ORAL
Qty: 0 | Refills: 0 | DISCHARGE

## 2022-11-14 RX ORDER — ATORVASTATIN CALCIUM 80 MG/1
10 TABLET, FILM COATED ORAL AT BEDTIME
Refills: 0 | Status: DISCONTINUED | OUTPATIENT
Start: 2022-11-14 | End: 2022-11-19

## 2022-11-14 RX ORDER — FAMOTIDINE 10 MG/ML
1 INJECTION INTRAVENOUS
Qty: 0 | Refills: 0 | DISCHARGE

## 2022-11-14 RX ORDER — SODIUM CHLORIDE 9 MG/ML
500 INJECTION INTRAMUSCULAR; INTRAVENOUS; SUBCUTANEOUS ONCE
Refills: 0 | Status: COMPLETED | OUTPATIENT
Start: 2022-11-14 | End: 2022-11-14

## 2022-11-14 RX ORDER — TAMSULOSIN HYDROCHLORIDE 0.4 MG/1
0.4 CAPSULE ORAL AT BEDTIME
Refills: 0 | Status: DISCONTINUED | OUTPATIENT
Start: 2022-11-14 | End: 2022-11-19

## 2022-11-14 RX ORDER — FENOFIBRATE,MICRONIZED 130 MG
145 CAPSULE ORAL DAILY
Refills: 0 | Status: DISCONTINUED | OUTPATIENT
Start: 2022-11-14 | End: 2022-11-19

## 2022-11-14 RX ORDER — WARFARIN SODIUM 2.5 MG/1
1 TABLET ORAL
Qty: 0 | Refills: 0 | DISCHARGE

## 2022-11-14 RX ADMIN — TAMSULOSIN HYDROCHLORIDE 0.4 MILLIGRAM(S): 0.4 CAPSULE ORAL at 21:46

## 2022-11-14 RX ADMIN — CEFTRIAXONE 100 MILLIGRAM(S): 500 INJECTION, POWDER, FOR SOLUTION INTRAMUSCULAR; INTRAVENOUS at 18:27

## 2022-11-14 RX ADMIN — OLANZAPINE 2.5 MILLIGRAM(S): 15 TABLET, FILM COATED ORAL at 22:45

## 2022-11-14 RX ADMIN — LEVETIRACETAM 500 MILLIGRAM(S): 250 TABLET, FILM COATED ORAL at 18:27

## 2022-11-14 RX ADMIN — ATORVASTATIN CALCIUM 10 MILLIGRAM(S): 80 TABLET, FILM COATED ORAL at 21:45

## 2022-11-14 RX ADMIN — SODIUM CHLORIDE 75 MILLILITER(S): 9 INJECTION INTRAMUSCULAR; INTRAVENOUS; SUBCUTANEOUS at 18:29

## 2022-11-14 RX ADMIN — WARFARIN SODIUM 2 MILLIGRAM(S): 2.5 TABLET ORAL at 21:46

## 2022-11-14 RX ADMIN — SODIUM CHLORIDE 500 MILLILITER(S): 9 INJECTION INTRAMUSCULAR; INTRAVENOUS; SUBCUTANEOUS at 08:43

## 2022-11-14 NOTE — H&P ADULT - PROBLEM SELECTOR PLAN 2
Home medication: Finasteride 5 mg and Tamsulosin 0.4 mg  Continue with home medication Patient's creatinine level was 3.36. (2.01 in 2019)  Per patient's wife, his creatinine level improved after Diaz's catheter placement. Contacted patient's PCP office to get baseline Creatinine but the office is closed Monday. (PCP. Dr. Manoj Chacon, Ph: [phone])  Dose medication based on CrCl.  Avoid Nephrotoxins and NSAIDs.

## 2022-11-14 NOTE — H&P ADULT - PROBLEM SELECTOR PLAN 8
history of AVR in 2013  Continued with Warfarin   Monitor INR history of AVR in 2013  Continued with Warfarin  Monitor INR (Goal 2-3)

## 2022-11-14 NOTE — H&P ADULT - NSICDXPASTSURGICALHX_GEN_ALL_CORE_FT
PAST SURGICAL HISTORY:  S/P aortic valve repair     S/P CABG (coronary artery bypass graft)     S/P carotid endarterectomy

## 2022-11-14 NOTE — PATIENT PROFILE ADULT - FALL HARM RISK - HARM RISK INTERVENTIONS

## 2022-11-14 NOTE — ED ADULT NURSE NOTE - ED STAT RN HANDOFF DETAILS 2
Pt transferred to [facility], pending bed assignment. Medication administered as ordered, IV to LUE intact, sapp draining clear yellow urine. Wife at bedside.
Negative/Unknown

## 2022-11-14 NOTE — H&P ADULT - NSHPPHYSICALEXAM_GEN_ALL_CORE
T(C): 37.2 (11-14-22 @ 07:24), Max: 37.2 (11-14-22 @ 07:24)  HR: 77 (11-14-22 @ 08:42) (75 - 80)  BP: 104/55 (11-14-22 @ 08:42) (95/53 - 185/83)  RR: 17 (11-14-22 @ 07:24) (16 - 17)  SpO2: 96% (11-14-22 @ 07:24) (96% - 96%)    PHYSICAL EXAM WAS LIMITED AS PATIENT HAS DEMENTIA.    CONSTITUTIONAL: Well groomed, no apparent distress, cachexic  EYES: Pupils are equal, about 2 mm in size and no reaction to lights. Ecchymosis and scratched mauricio noted on the infraorbital skin. No conjunctival or scleral injection, non-icteric  NECK: Supple, symmetric and without tracheal deviation   RESP: No respiratory distress, no use of accessory muscles; CTA b/l, no WRR  CV: RRR, +S1S2, no MRG; no JVD; no peripheral edema  GI:  Rigid/hard to palpate on periumbilical and suprapubic areas. NT, ND, no rebound, no guarding; no palpable masses; no hepatosplenomegaly  Genital: old blood noted at the urethral meatus and on the gland penis. Diaz's in place. Clear urine with blood clots and cast noted in the line.   SKIN: Echymosis and scratched mauricio noted on the skin below left eye. No rashes or ulcers noted; no subcutaneous nodules or induration palpable. No pressure ulcer noted.   NEURO: Decorticate posture noted on the right side with muscle rigidity on the right upper extremity.   PSYCH: Patient is non-verbal.

## 2022-11-14 NOTE — H&P ADULT - PROBLEM SELECTOR PLAN 4
Patient is bed bound and wheelchair bound.  Pressure ulcer precaution  Patient was evaluated for speech and swallow at [facility] in October: Recommended Dysphagia solid level 6 soft/bite sized food and mildly thick liquid level 2.     Continued with Pravastatin 20 mg

## 2022-11-14 NOTE — CHART NOTE - NSCHARTNOTEFT_GEN_A_CORE
EVENT: Called to assess pt for agitation    HPI:  87-year-old male with history of recent Candida UTI, Dementia, CKD, CVA in 2005 and 2017 with residual right sided paralysis leading to bed bound, Seizure disorder, Carotid endarterectomy, CABG and Aortic valve repair on Coumadin admitted for UTI in the setting of urethral injury from self removal of Diaz catheter.    SUBJECTIVE: Pt seen and evaluated at bedside, unable to assess due to advanced dementia. Pt is combative towards staff, attempting to hit, punch, and kick. Difficult to re-orient.     OBJECTIVE:  Vital Signs Last 24 Hrs  T(C): 36.4 (14 Nov 2022 20:37), Max: 37.2 (14 Nov 2022 07:24)  T(F): 97.5 (14 Nov 2022 20:37), Max: 98.9 (14 Nov 2022 07:24)  HR: 69 (14 Nov 2022 20:37) (69 - 84)  BP: 150/67 (14 Nov 2022 20:37) (95/53 - 185/83)  BP(mean): --  RR: 18 (14 Nov 2022 20:37) (16 - 20)  SpO2: 97% (14 Nov 2022 20:37) (96% - 100%)    Parameters below as of 14 Nov 2022 20:37  Patient On (Oxygen Delivery Method): room air      PHYSICAL EXAM:  General: Chronic ill-appearing, frail elderly man   Neuro: Awake and alert, but confused. Hx of dementia  Cardiovascular: + S1, S2, no murmurs, rubs, or bruits  Respiratory: clear to auscultation bilaterally with good air entry   GI: Abdomen soft, non-tender, bowel sounds present   : Non distended; +Diaz catheter   Skin: Bruising under left eye      LABS:                        9.9    3.39  )-----------( 190      ( 14 Nov 2022 04:35 )             31.9     11-14    140  |  110<H>  |  68<H>  ----------------------------<  311<H>  4.3   |  24  |  3.36<H>    Ca    9.4      14 Nov 2022 04:35    TPro  7.6  /  Alb  2.9<L>  /  TBili  0.2  /  DBili  x   /  AST  22  /  ALT  22  /  AlkPhos  83  11-14        EKG:   IMAGING:    ASSESSMENT/PROBLEM: Acute agitation likely multifactorial in the setting of UTI vs. advanced dementia     PLAN:     -Zyprexa 2.5 mg IM x 1 dose, ordered  -Re-orient frequently  -Fall precautions  -Continue current/supportive measures    FOLLOW UP / RESULT:     -Monitor effectiveness of antipsychotic

## 2022-11-14 NOTE — ED ADULT NURSE NOTE - NSIMPLEMENTINTERV_GEN_ALL_ED
Implemented All Universal Safety Interventions:  [place] to call system. Call bell, personal items and telephone within reach. Instruct patient to call for assistance. Room bathroom lighting operational. Non-slip footwear when patient is off stretcher. Physically safe environment: no spills, clutter or unnecessary equipment. Stretcher in lowest position, wheels locked, appropriate side rails in place.

## 2022-11-14 NOTE — H&P ADULT - ASSESSMENT
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

## 2022-11-14 NOTE — H&P ADULT - CONVERSATION DETAILS
Will agree to consider an appropriate level of outpatient care Will agree to consider an appropriate level of outpatient care Will agree to consider an appropriate level of outpatient care spoke to the patient's wife regarding GOC  DNR/DNI

## 2022-11-14 NOTE — H&P ADULT - NSICDXPASTMEDICALHX_GEN_ALL_CORE_FT
Received a fax from [employer] stating patient is scheduled for a 72 hour veeg on 4/20/21 any questions call [phone] ext [phone]    PAST MEDICAL HISTORY:  BPH (benign prostatic hyperplasia)     CVA (cerebral vascular accident)     CVA (cerebrovascular accident)     Diabetes     HLD (hyperlipidemia)     Seizure      PAST MEDICAL HISTORY:  Bilateral hydronephrosis     BPH (benign prostatic hyperplasia)     CVA (cerebral vascular accident) (2015 and 2017)    Diabetes     HLD (hyperlipidemia)     Seizure     Stage 4 chronic kidney disease     Unilateral paralysis due to acute cerebrovascular accident (CVA)

## 2022-11-14 NOTE — ED PROVIDER NOTE - OBJECTIVE STATEMENT
86 yo Dementia, CKD, CVA (2015 and 2017), Residual Rt body paralysis, Dysarthria, Wheelchair bound, Seizure disorder, CEA, AVR on Coumadin and CABG p/w bloody urine after pulling on sapp catheter last night. Pt wife contacted visiting nurse who told her to call EMS. Pt was recently admitted to [facility] for UTI last month and required sapp at that time. No cough, sob, vomiting or fever.

## 2022-11-14 NOTE — ED PROVIDER NOTE - CLINICAL SUMMARY MEDICAL DECISION MAKING FREE TEXT BOX
88 yo male with dementia p/w blood from sapp this evening due to trauma. Pt with clear urine in sapp. Pt hypothermic in ED. Will send sepsis w/u and eval for UTI

## 2022-11-14 NOTE — H&P ADULT - HISTORY OF PRESENT ILLNESS
Patient is non-verbal secondary to CVA and dementia and this history was taken from his wife, Julissa Antunez ([phone], cell: [phone]) who is the care giver of the patient.    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 Patient's Diaz's catheter was placed during his last admission at [facility] for hypoglycemia and Urine culture confirmed Candida albicans UTI. Patient was treated with Diflucan 200 mg bid, 6 days of Ceftriaxone continued with 10-14 days of Cefpodoxime. His last Cefpodoxime dose was on 22OCT2022. His Diaz's catheter was changed in the beginning of November at the Urology office and plans to change a new one on 29NOV2022. Denied cough, vomiting, fever, or diarrhea.    In the ED, patient's BP was 185/83 mmHg which decreased to 95/53 mmHg. Patient was given IV  ml bolus. CMP, CBC, PT/INR, aPTT, UA, Urine culure, blood culture, EKG and CXR were obtained. UA is positive for RBCs, WBCs, leukocyte esterase, few bacteria, ketone protein, Hyaline cast and Glucose. Lactate was normal. Patient's Creatinine was 3.36. (2.01 in 2019). EKG showed wide QRS, nonspecific interventricular block and T wave abnormality. Patient is admitted to the Internal Medicine for UTI management.  Patient is dysarthric secondary to CVA and dementia and this history was taken from his wife, Julissa Antunez ([phone], cell: [phone]) who is the care giver of the patient.    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 Patient's Diaz's catheter was placed during his last admission at [facility] for hypoglycemia and Urine culture confirmed Candida albicans UTI. Patient was treated with Diflucan 200 mg bid, 6 days of Ceftriaxone continued with 10-14 days of Cefpodoxime. His last Cefpodoxime dose was on 22OCT2022. His Diaz's catheter was changed in the beginning of November at the Urology office and plans to change a new one on 29NOV2022. Denied cough, vomiting, fever, or diarrhea.    In the ED, patient's BP was 185/83 mmHg which decreased to 95/53 mmHg. Patient was given IV  ml bolus. CMP, CBC, PT/INR, aPTT, UA, Urine culure, blood culture, EKG and CXR were obtained. UA is positive for RBCs, WBCs, leukocyte esterase, few bacteria, ketone protein, Hyaline cast and Glucose. Lactate was normal. Patient's Creatinine was 3.36. (2.01 in 2019). EKG showed wide QRS, nonspecific interventricular block and T wave abnormality. Patient is admitted to the Internal Medicine for UTI management.     H&P written by MSLora Nunez and reviewed/revised by PGY-2 Ray Stanton Ambida

## 2022-11-14 NOTE — H&P ADULT - PROBLEM SELECTOR PLAN 3
Patient's creatinine level was 3.36. (2.01 in 2019)  Per patient's wife, his creatinine level improved after Diaz's catheter placement.  Dose medication based on CrCl.   Avoid Nephrotoxins and NSAIDs. Patient's creatinine level was 3.36. (2.01 in 2019)  Per patient's wife, his creatinine level improved after Diaz's catheter placement. Contacted patient's PCP office to get baseline Creatinine but the office is closed Monday. (PCP. Dr. Manoj Chacon, Ph: [phone])  Dose medication based on CrCl.   Avoid Nephrotoxins and NSAIDs. Home medication: Finasteride 5 mg and Tamsulosin 0.4 mg  Continue with home medication

## 2022-11-14 NOTE — H&P ADULT - PROBLEM SELECTOR PLAN 1
Patient has recent history of Candida albicans UTI in October and was treated with Diflucan, as well as Ceftriaxone and Cefpodoxime.  Patient is afebrile.  Rigid hard abdomen on umbilical and suprapubic areas, concerning for stool impaction or masses. Consider obtaining imaging.  WBC 3.39.   UA is positive for RBCs, WBCs, leukocyte esterase in ED. 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  Pending Urine culture and blood cultures.   1/ 3 on SIR criteria and 1-2 points on qSOFA (as cannot access patient's mental status)   Will start on Zosyn as patient received Antibiotics recently. Patient has recent history of Candida albicans UTI in October and was treated with Diflucan, as well as Ceftriaxone and Cefpodoxime.  Patient is afebrile.  Rigid hard abdomen on umbilical and suprapubic areas, concerning for stool impaction or masses. Consider obtaining imaging.  WBC 3.39.   UA is positive for RBCs, WBCs, leukocyte esterase in ED. 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  Pending Urine culture and blood cultures.   1/ 3 on SIR criteria and 1-2 points on qSOFA (as cannot access patient's mental status)   continue on Ceftriaxone for now  change antibiotics accordingly based on the culture and sensitivity

## 2022-11-14 NOTE — ED ADULT TRIAGE NOTE - CHIEF COMPLAINT QUOTE
mahogany from home accompanied by wife with c/o tried to pulled out sapp catheter. there is blood in the bag

## 2022-11-14 NOTE — H&P ADULT - FAMILY/SPOUSE
Bed: 03  Expected date:   Expected time:   Means of arrival:   Comments:  1st triage  
wife, Julissa Antunez ([phone], cell: [phone])

## 2022-11-15 LAB
A1C WITH ESTIMATED AVERAGE GLUCOSE RESULT: 7.6 % — HIGH (ref 4–5.6)
ALBUMIN SERPL ELPH-MCNC: 2.8 G/DL — LOW (ref 3.5–5)
ALP SERPL-CCNC: 76 U/L — SIGNIFICANT CHANGE UP (ref 40–120)
ALT FLD-CCNC: 22 U/L DA — SIGNIFICANT CHANGE UP (ref 10–60)
ANION GAP SERPL CALC-SCNC: 4 MMOL/L — LOW (ref 5–17)
APTT BLD: 36.6 SEC — HIGH (ref 27.5–35.5)
AST SERPL-CCNC: 31 U/L — SIGNIFICANT CHANGE UP (ref 10–40)
BASOPHILS # BLD AUTO: 0.03 K/UL — SIGNIFICANT CHANGE UP (ref 0–0.2)
BASOPHILS NFR BLD AUTO: 0.4 % — SIGNIFICANT CHANGE UP (ref 0–2)
BILIRUB SERPL-MCNC: 0.3 MG/DL — SIGNIFICANT CHANGE UP (ref 0.2–1.2)
BUN SERPL-MCNC: 58 MG/DL — HIGH (ref 7–18)
CALCIUM SERPL-MCNC: 9.1 MG/DL — SIGNIFICANT CHANGE UP (ref 8.4–10.5)
CHLORIDE SERPL-SCNC: 116 MMOL/L — HIGH (ref 96–108)
CO2 SERPL-SCNC: 24 MMOL/L — SIGNIFICANT CHANGE UP (ref 22–31)
CREAT SERPL-MCNC: 2.5 MG/DL — HIGH (ref 0.5–1.3)
EGFR: 24 ML/MIN/1.73M2 — LOW
EOSINOPHIL # BLD AUTO: 0.15 K/UL — SIGNIFICANT CHANGE UP (ref 0–0.5)
EOSINOPHIL NFR BLD AUTO: 2.1 % — SIGNIFICANT CHANGE UP (ref 0–6)
ESTIMATED AVERAGE GLUCOSE: 171 MG/DL — HIGH (ref 68–114)
GLUCOSE BLDC GLUCOMTR-MCNC: 119 MG/DL — HIGH (ref 70–99)
GLUCOSE BLDC GLUCOMTR-MCNC: 137 MG/DL — HIGH (ref 70–99)
GLUCOSE BLDC GLUCOMTR-MCNC: 172 MG/DL — HIGH (ref 70–99)
GLUCOSE BLDC GLUCOMTR-MCNC: 96 MG/DL — SIGNIFICANT CHANGE UP (ref 70–99)
GLUCOSE SERPL-MCNC: 105 MG/DL — HIGH (ref 70–99)
HCT VFR BLD CALC: 30.3 % — LOW (ref 39–50)
HGB BLD-MCNC: 9.4 G/DL — LOW (ref 13–17)
IMM GRANULOCYTES NFR BLD AUTO: 0.3 % — SIGNIFICANT CHANGE UP (ref 0–0.9)
INR BLD: 1.49 RATIO — HIGH (ref 0.88–1.16)
LYMPHOCYTES # BLD AUTO: 1.5 K/UL — SIGNIFICANT CHANGE UP (ref 1–3.3)
LYMPHOCYTES # BLD AUTO: 21 % — SIGNIFICANT CHANGE UP (ref 13–44)
MAGNESIUM SERPL-MCNC: 2 MG/DL — SIGNIFICANT CHANGE UP (ref 1.6–2.6)
MCHC RBC-ENTMCNC: 26.9 PG — LOW (ref 27–34)
MCHC RBC-ENTMCNC: 31 GM/DL — LOW (ref 32–36)
MCV RBC AUTO: 86.8 FL — SIGNIFICANT CHANGE UP (ref 80–100)
MONOCYTES # BLD AUTO: 0.62 K/UL — SIGNIFICANT CHANGE UP (ref 0–0.9)
MONOCYTES NFR BLD AUTO: 8.7 % — SIGNIFICANT CHANGE UP (ref 2–14)
NEUTROPHILS # BLD AUTO: 4.82 K/UL — SIGNIFICANT CHANGE UP (ref 1.8–7.4)
NEUTROPHILS NFR BLD AUTO: 67.5 % — SIGNIFICANT CHANGE UP (ref 43–77)
NRBC # BLD: 0 /100 WBCS — SIGNIFICANT CHANGE UP (ref 0–0)
PHOSPHATE SERPL-MCNC: 2.6 MG/DL — SIGNIFICANT CHANGE UP (ref 2.5–4.5)
PLATELET # BLD AUTO: 199 K/UL — SIGNIFICANT CHANGE UP (ref 150–400)
POTASSIUM SERPL-MCNC: 4.3 MMOL/L — SIGNIFICANT CHANGE UP (ref 3.5–5.3)
POTASSIUM SERPL-SCNC: 4.3 MMOL/L — SIGNIFICANT CHANGE UP (ref 3.5–5.3)
PROT SERPL-MCNC: 7.1 G/DL — SIGNIFICANT CHANGE UP (ref 6–8.3)
PROTHROM AB SERPL-ACNC: 17.8 SEC — HIGH (ref 10.5–13.4)
RBC # BLD: 3.49 M/UL — LOW (ref 4.2–5.8)
RBC # FLD: 14.8 % — HIGH (ref 10.3–14.5)
SODIUM SERPL-SCNC: 144 MMOL/L — SIGNIFICANT CHANGE UP (ref 135–145)
WBC # BLD: 7.14 K/UL — SIGNIFICANT CHANGE UP (ref 3.8–10.5)
WBC # FLD AUTO: 7.14 K/UL — SIGNIFICANT CHANGE UP (ref 3.8–10.5)

## 2022-11-15 PROCEDURE — 99232 SBSQ HOSP IP/OBS MODERATE 35: CPT

## 2022-11-15 RX ORDER — HYDRALAZINE HCL 50 MG
10 TABLET ORAL ONCE
Refills: 0 | Status: COMPLETED | OUTPATIENT
Start: 2022-11-15 | End: 2022-11-15

## 2022-11-15 RX ORDER — AMLODIPINE BESYLATE 2.5 MG/1
5 TABLET ORAL ONCE
Refills: 0 | Status: COMPLETED | OUTPATIENT
Start: 2022-11-15 | End: 2022-11-15

## 2022-11-15 RX ORDER — WARFARIN SODIUM 2.5 MG/1
3 TABLET ORAL ONCE
Refills: 0 | Status: COMPLETED | OUTPATIENT
Start: 2022-11-15 | End: 2022-11-15

## 2022-11-15 RX ORDER — SODIUM CHLORIDE 9 MG/ML
1000 INJECTION, SOLUTION INTRAVENOUS
Refills: 0 | Status: DISCONTINUED | OUTPATIENT
Start: 2022-11-15 | End: 2022-11-19

## 2022-11-15 RX ADMIN — SODIUM CHLORIDE 75 MILLILITER(S): 9 INJECTION INTRAMUSCULAR; INTRAVENOUS; SUBCUTANEOUS at 08:02

## 2022-11-15 RX ADMIN — CEFTRIAXONE 100 MILLIGRAM(S): 500 INJECTION, POWDER, FOR SOLUTION INTRAMUSCULAR; INTRAVENOUS at 18:26

## 2022-11-15 RX ADMIN — Medication 10 MILLIGRAM(S): at 11:32

## 2022-11-15 RX ADMIN — LEVETIRACETAM 500 MILLIGRAM(S): 250 TABLET, FILM COATED ORAL at 18:26

## 2022-11-15 RX ADMIN — SODIUM CHLORIDE 60 MILLILITER(S): 9 INJECTION, SOLUTION INTRAVENOUS at 11:36

## 2022-11-15 RX ADMIN — FINASTERIDE 5 MILLIGRAM(S): 5 TABLET, FILM COATED ORAL at 13:12

## 2022-11-15 RX ADMIN — LEVETIRACETAM 500 MILLIGRAM(S): 250 TABLET, FILM COATED ORAL at 06:51

## 2022-11-15 RX ADMIN — Medication 0: at 11:33

## 2022-11-15 RX ADMIN — Medication 145 MILLIGRAM(S): at 13:12

## 2022-11-15 NOTE — PROGRESS NOTE ADULT - SUBJECTIVE AND OBJECTIVE BOX
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 +UTI    Agitated overnight, also hypertensive. Per RN was spitting out food because of agitation.      Allergies    [This allergen will not trigger allergy alert] Originally Entered as [Unknown] reaction to [BEE STING] (Unknown)  No Known Drug Allergies    Intolerances      Vital Signs Last 24 Hrs  T(C): 36.4 (15 Nov 2022 09:08), Max: 36.4 (14 Nov 2022 16:46)  T(F): 97.5 (15 Nov 2022 09:08), Max: 97.6 (15 Nov 2022 08:09)  HR: 61 (15 Nov 2022 09:08) (61 - 84)  BP: 183/67 (15 Nov 2022 09:08) (150/67 - 192/71)  BP(mean): --  RR: 18 (15 Nov 2022 09:08) (18 - 20)  SpO2: 98% (15 Nov 2022 09:08) (94% - 100%)    Parameters below as of 15 Nov 2022 09:08  Patient On (Oxygen Delivery Method): room air        MedsMEDICATIONS  (STANDING):  amLODIPine   Tablet 5 milliGRAM(s) Oral once  atorvastatin 10 milliGRAM(s) Oral at bedtime  cefTRIAXone   IVPB 1000 milliGRAM(s) IV Intermittent every 24 hours  fenofibrate Tablet 145 milliGRAM(s) Oral daily  finasteride 5 milliGRAM(s) Oral daily  insulin lispro (ADMELOG) corrective regimen sliding scale   SubCutaneous three times a day before meals  levETIRAcetam 500 milliGRAM(s) Oral two times a day  tamsulosin 0.4 milliGRAM(s) Oral at bedtime    MEDICATIONS  (PRN):      PHYSICAL EXAM:  GENERAL: Appears agitated, awake  NEURO: Unable to assess, LUE 5/5 strength, does not move RUE, speech garbled, unable to understand  LUNG: Refused exam  HEART: Refused exam  ABDOMEN: Refused exam  EXTREMITIES:  No LE edema  SKIN: No rashes or lesions    Consultant(s) Notes Reviewed:  [x ] YES  [ ] NO  Care Discussed with Consultants/Other Providers [ x] YES  [ ] NO    LABS:                        9.4    7.14  )-----------( 199      ( 15 Nov 2022 06:30 )             30.3     11-15    144  |  116<H>  |  58<H>  ----------------------------<  105<H>  4.3   |  24  |  2.50<H>    Ca    9.1      15 Nov 2022 06:30  Phos  2.6     11-15  Mg     2.0     11-15    TPro  7.1  /  Alb  2.8<L>  /  TBili  0.3  /  DBili  x   /  AST  31  /  ALT  22  /  AlkPhos  76  11-15    PT/INR - ( 15 Nov 2022 06:30 )   PT: 17.8 sec;   INR: 1.49 ratio         PTT - ( 15 Nov 2022 06:30 )  PTT:36.6 sec    RADIOLOGY & ADDITIONAL TESTS:    EKG:

## 2022-11-15 NOTE — PROGRESS NOTE ADULT - SUBJECTIVE AND OBJECTIVE BOX
PATIENT SEEN AND EXAMINED ON :- 11/15/22  DATE OF SERVICE:     11/15/22        Interim events noted,Labs ,Radiological studies and Cardiology tests reviewed .       HOSPITAL COURSE: HPI:  Patient is dysarthric secondary to CVA and dementia and this history was taken from his wife, Julissa Antunez ([phone], cell: [phone]) who is the care giver of the patient.    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 Patient's Diaz's catheter was placed during his last admission at [facility] for hypoglycemia and Urine culture confirmed Candida albicans UTI. Patient was treated with Diflucan 200 mg bid, 6 days of Ceftriaxone continued with 10-14 days of Cefpodoxime. His last Cefpodoxime dose was on 22OCT2022. His Diaz's catheter was changed in the beginning of November at the Urology office and plans to change a new one on 29NOV2022. Denied cough, vomiting, fever, or diarrhea.    In the ED, patient's BP was 185/83 mmHg which decreased to 95/53 mmHg. Patient was given IV  ml bolus. CMP, CBC, PT/INR, aPTT, UA, Urine culure, blood culture, EKG and CXR were obtained. UA is positive for RBCs, WBCs, leukocyte esterase, few bacteria, ketone protein, Hyaline cast and Glucose. Lactate was normal. Patient's Creatinine was 3.36. (2.01 in 2019). EKG showed wide QRS, nonspecific interventricular block and T wave abnormality. Patient is admitted to the Internal Medicine for UTI management.     H&P written by MS4 Trino Nunez and reviewed/revised by PGY-2 Ray [place] Ambida (14 Nov 2022 09:42)      INTERIM EVENTS:Patient seen at bedside ,interim events noted.      PMH -reviewed admission note, no change since admission  HEART FAILURE: Acute[ ]Chronic[ ] Systolic[ ] Diastolic[ ] Combined Systolic and Diastolic[ ]  CAD[ ] CABG[ ] PCI[ ]  DEVICES[ ] PPM[ ] ICD[ ] ILR[ ]  ATRIAL FIBRILLATION[ ] Paroxysmal[ ] Permanent[ ] CHADS2-[  ]  JORDAN[ ] CKD1[ ] CKD2[ ] CKD3[ ] CKD4[ ] ESRD[ ]  COPD[ ] HTN[ ]   DM[ ] Type1[ ] Type 2[ ]   CVA[ ] Paresis[ ]    AMBULATION: Assisted[ ] Cane/walker[ ] Independent[ ]    MEDICATIONS  (STANDING):  atorvastatin 10 milliGRAM(s) Oral at bedtime  cefTRIAXone   IVPB 1000 milliGRAM(s) IV Intermittent every 24 hours  fenofibrate Tablet 145 milliGRAM(s) Oral daily  finasteride 5 milliGRAM(s) Oral daily  insulin lispro (ADMELOG) corrective regimen sliding scale   SubCutaneous three times a day before meals  levETIRAcetam 500 milliGRAM(s) Oral two times a day  sodium chloride 0.45%. 1000 milliLiter(s) (60 mL/Hr) IV Continuous <Continuous>  tamsulosin 0.4 milliGRAM(s) Oral at bedtime  warfarin 3 milliGRAM(s) Oral once    MEDICATIONS  (PRN):      REVIEW OF SYSTEMS:  Constitutional: [ ] fever, [ ]weight loss,  [ ]fatigue [ ]weight gain  Eyes: [ ] visual changes  Respiratory: [ ]shortness of breath;  [ ] cough, [ ]wheezing, [ ]chills, [ ]hemoptysis  Cardiovascular: [ ] chest pain, [ ]palpitations, [ ]dizziness,  [ ]leg swelling[ ]orthopnea[ ]PND  Gastrointestinal: [ ] abdominal pain, [ ]nausea, [ ]vomiting,  [ ]diarrhea [ ]Constipation [ ]Melena  Genitourinary: [ ] dysuria, [ ] hematuria [ ]Diaz  Neurologic: [ ] headaches [ ] tremors[ ]weakness [ ]Paralysis Right[ ] Left[ ]  Skin: [ ] itching, [ ]burning, [ ] rashes  Endocrine: [ ] heat or cold intolerance  Musculoskeletal: [ ] joint pain or swelling; [ ] muscle, back, or extremity pain  Psychiatric: [ ] depression, [ ]anxiety, [ ]mood swings, or [ ]difficulty sleeping  Hematologic: [ ] easy bruising, [ ] bleeding gums    [ ] All remaining systems negative except as per above.   [ ]Unable to obtain.  [x] No change in ROS since admission      Vital Signs Last 24 Hrs  T(C): 36.2 (15 Nov 2022 14:48), Max: 36.4 (14 Nov 2022 20:37)  T(F): 97.2 (15 Nov 2022 14:48), Max: 97.6 (15 Nov 2022 08:09)  HR: 81 (15 Nov 2022 14:48) (61 - 81)  BP: 136/61 (15 Nov 2022 14:48) (134/53 - 192/71)  BP(mean): --  RR: 18 (15 Nov 2022 14:48) (18 - 18)  SpO2: 97% (15 Nov 2022 14:48) (94% - 98%)    Parameters below as of 15 Nov 2022 14:48  Patient On (Oxygen Delivery Method): room air      I&O's Summary    14 Nov 2022 07:01  -  15 Nov 2022 07:00  --------------------------------------------------------  IN: 0 mL / OUT: 1700 mL / NET: -1700 mL    15 Nov 2022 07:01  -  15 Nov 2022 19:18  --------------------------------------------------------  IN: 0 mL / OUT: 400 mL / NET: -400 mL        PHYSICAL EXAM:  General: No acute distress BMI-  HEENT: EOMI, PERRL  Neck: Supple, [ ] JVD  Lungs: Equal air entry bilaterally; [ ] rales [ ] wheezing [ ] rhonchi  Heart: Regular rate and rhythm; [x ] murmur   2/6 [ x] systolic [ ] diastolic [ ] radiation[ ] rubs [ ]  gallops  Abdomen: Nontender, bowel sounds present  Extremities: No clubbing, cyanosis, [ ] edema [ ]Pulses  equal and intact  Nervous system:  Alert & Oriented X3, no focal deficits  Psychiatric: Normal affect  Skin: No rashes or lesions    LABS:  11-15    144  |  116<H>  |  58<H>  ----------------------------<  105<H>  4.3   |  24  |  2.50<H>    Ca    9.1      15 Nov 2022 06:30  Phos  2.6     11-15  Mg     2.0     11-15    TPro  7.1  /  Alb  2.8<L>  /  TBili  0.3  /  DBili  x   /  AST  31  /  ALT  22  /  AlkPhos  76  11-15    Creatinine Trend: 2.50<--, 3.36<--                        9.4    7.14  )-----------( 199      ( 15 Nov 2022 06:30 )             30.3     PT/INR - ( 15 Nov 2022 06:30 )   PT: 17.8 sec;   INR: 1.49 ratio         PTT - ( 15 Nov 2022 06:30 )  PTT:36.6 sec

## 2022-11-16 LAB
-  AMIKACIN: SIGNIFICANT CHANGE UP
-  AMOXICILLIN/CLAVULANIC ACID: SIGNIFICANT CHANGE UP
-  AMPICILLIN/SULBACTAM: SIGNIFICANT CHANGE UP
-  AMPICILLIN: SIGNIFICANT CHANGE UP
-  AZTREONAM: SIGNIFICANT CHANGE UP
-  CEFAZOLIN: SIGNIFICANT CHANGE UP
-  CEFEPIME: SIGNIFICANT CHANGE UP
-  CEFOXITIN: SIGNIFICANT CHANGE UP
-  CEFTRIAXONE: SIGNIFICANT CHANGE UP
-  CIPROFLOXACIN: SIGNIFICANT CHANGE UP
-  ERTAPENEM: SIGNIFICANT CHANGE UP
-  GENTAMICIN: SIGNIFICANT CHANGE UP
-  IMIPENEM: SIGNIFICANT CHANGE UP
-  LEVOFLOXACIN: SIGNIFICANT CHANGE UP
-  MEROPENEM: SIGNIFICANT CHANGE UP
-  NITROFURANTOIN: SIGNIFICANT CHANGE UP
-  PIPERACILLIN/TAZOBACTAM: SIGNIFICANT CHANGE UP
-  TOBRAMYCIN: SIGNIFICANT CHANGE UP
-  TRIMETHOPRIM/SULFAMETHOXAZOLE: SIGNIFICANT CHANGE UP
ANION GAP SERPL CALC-SCNC: 6 MMOL/L — SIGNIFICANT CHANGE UP (ref 5–17)
BUN SERPL-MCNC: 45 MG/DL — HIGH (ref 7–18)
CALCIUM SERPL-MCNC: 8.5 MG/DL — SIGNIFICANT CHANGE UP (ref 8.4–10.5)
CHLORIDE SERPL-SCNC: 116 MMOL/L — HIGH (ref 96–108)
CO2 SERPL-SCNC: 24 MMOL/L — SIGNIFICANT CHANGE UP (ref 22–31)
CREAT SERPL-MCNC: 2.18 MG/DL — HIGH (ref 0.5–1.3)
EGFR: 29 ML/MIN/1.73M2 — LOW
GLUCOSE BLDC GLUCOMTR-MCNC: 106 MG/DL — HIGH (ref 70–99)
GLUCOSE BLDC GLUCOMTR-MCNC: 111 MG/DL — HIGH (ref 70–99)
GLUCOSE BLDC GLUCOMTR-MCNC: 115 MG/DL — HIGH (ref 70–99)
GLUCOSE BLDC GLUCOMTR-MCNC: 176 MG/DL — HIGH (ref 70–99)
GLUCOSE SERPL-MCNC: 114 MG/DL — HIGH (ref 70–99)
HCT VFR BLD CALC: 27.6 % — LOW (ref 39–50)
HGB BLD-MCNC: 8.5 G/DL — LOW (ref 13–17)
INR BLD: 1.75 RATIO — HIGH (ref 0.88–1.16)
MCHC RBC-ENTMCNC: 27 PG — SIGNIFICANT CHANGE UP (ref 27–34)
MCHC RBC-ENTMCNC: 30.8 GM/DL — LOW (ref 32–36)
MCV RBC AUTO: 87.6 FL — SIGNIFICANT CHANGE UP (ref 80–100)
METHOD TYPE: SIGNIFICANT CHANGE UP
NRBC # BLD: 0 /100 WBCS — SIGNIFICANT CHANGE UP (ref 0–0)
PLATELET # BLD AUTO: 170 K/UL — SIGNIFICANT CHANGE UP (ref 150–400)
POTASSIUM SERPL-MCNC: 4.5 MMOL/L — SIGNIFICANT CHANGE UP (ref 3.5–5.3)
POTASSIUM SERPL-SCNC: 4.5 MMOL/L — SIGNIFICANT CHANGE UP (ref 3.5–5.3)
PROTHROM AB SERPL-ACNC: 20.9 SEC — HIGH (ref 10.5–13.4)
RBC # BLD: 3.15 M/UL — LOW (ref 4.2–5.8)
RBC # FLD: 14.9 % — HIGH (ref 10.3–14.5)
SODIUM SERPL-SCNC: 146 MMOL/L — HIGH (ref 135–145)
WBC # BLD: 4.66 K/UL — SIGNIFICANT CHANGE UP (ref 3.8–10.5)
WBC # FLD AUTO: 4.66 K/UL — SIGNIFICANT CHANGE UP (ref 3.8–10.5)

## 2022-11-16 RX ORDER — WARFARIN SODIUM 2.5 MG/1
2 TABLET ORAL ONCE
Refills: 0 | Status: COMPLETED | OUTPATIENT
Start: 2022-11-16 | End: 2022-11-16

## 2022-11-16 RX ORDER — VANCOMYCIN HCL 1 G
1000 VIAL (EA) INTRAVENOUS ONCE
Refills: 0 | Status: COMPLETED | OUTPATIENT
Start: 2022-11-16 | End: 2022-11-16

## 2022-11-16 RX ORDER — CIPROFLOXACIN LACTATE 400MG/40ML
200 VIAL (ML) INTRAVENOUS EVERY 12 HOURS
Refills: 0 | Status: DISCONTINUED | OUTPATIENT
Start: 2022-11-16 | End: 2022-11-17

## 2022-11-16 RX ORDER — CIPROFLOXACIN LACTATE 400MG/40ML
400 VIAL (ML) INTRAVENOUS EVERY 12 HOURS
Refills: 0 | Status: DISCONTINUED | OUTPATIENT
Start: 2022-11-16 | End: 2022-11-16

## 2022-11-16 RX ADMIN — LEVETIRACETAM 500 MILLIGRAM(S): 250 TABLET, FILM COATED ORAL at 17:36

## 2022-11-16 RX ADMIN — ATORVASTATIN CALCIUM 10 MILLIGRAM(S): 80 TABLET, FILM COATED ORAL at 21:33

## 2022-11-16 RX ADMIN — WARFARIN SODIUM 2 MILLIGRAM(S): 2.5 TABLET ORAL at 21:33

## 2022-11-16 RX ADMIN — Medication 1: at 17:36

## 2022-11-16 RX ADMIN — Medication 250 MILLIGRAM(S): at 21:31

## 2022-11-16 RX ADMIN — SODIUM CHLORIDE 60 MILLILITER(S): 9 INJECTION, SOLUTION INTRAVENOUS at 06:06

## 2022-11-16 RX ADMIN — Medication 145 MILLIGRAM(S): at 11:41

## 2022-11-16 RX ADMIN — FINASTERIDE 5 MILLIGRAM(S): 5 TABLET, FILM COATED ORAL at 11:41

## 2022-11-16 RX ADMIN — SODIUM CHLORIDE 60 MILLILITER(S): 9 INJECTION, SOLUTION INTRAVENOUS at 21:52

## 2022-11-16 RX ADMIN — TAMSULOSIN HYDROCHLORIDE 0.4 MILLIGRAM(S): 0.4 CAPSULE ORAL at 21:33

## 2022-11-16 RX ADMIN — LEVETIRACETAM 500 MILLIGRAM(S): 250 TABLET, FILM COATED ORAL at 07:44

## 2022-11-16 RX ADMIN — Medication 200 MILLIGRAM(S): at 17:35

## 2022-11-16 NOTE — CONSULT NOTE ADULT - SUBJECTIVE AND OBJECTIVE BOX
HPI:  Patient is dysarthric secondary to CVA and dementia and this history was taken from his wife, Julissa Antunez ([phone], cell: [phone]) who is the care giver of the patient.    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 Patient's Diaz's catheter was placed during his last admission at [facility] for hypoglycemia and Urine culture confirmed Candida albicans UTI. Patient was treated with Diflucan 200 mg bid, 6 days of Ceftriaxone continued with 10-14 days of Cefpodoxime. His last Cefpodoxime dose was on 22OCT2022. His Diaz's catheter was changed in the beginning of November at the Urology office and plans to change a new one on 29NOV2022. Denied cough, vomiting, fever, or diarrhea.    In the ED, patient's BP was 185/83 mmHg which decreased to 95/53 mmHg. Patient was given IV  ml bolus. CMP, CBC, PT/INR, aPTT, UA, Urine culure, blood culture, EKG and CXR were obtained. UA is positive for RBCs, WBCs, leukocyte esterase, few bacteria, ketone protein, Hyaline cast and Glucose. Lactate was normal. Patient's Creatinine was 3.36. (2.01 in 2019). EKG showed wide QRS, nonspecific interventricular block and T wave abnormality. Patient is admitted to the Internal Medicine for UTI management.         History as above, asked to see this patient as he was found to have a UTI with a resistant E. Coli and Enterococcus.             PAST MEDICAL & SURGICAL HISTORY:  CVA (cerebral vascular accident)  (2015 and 2017)      BPH (benign prostatic hyperplasia)      Diabetes      Seizure      HLD (hyperlipidemia)      Unilateral paralysis due to acute cerebrovascular accident (CVA)      Stage 4 chronic kidney disease      Bilateral hydronephrosis      S/P CABG (coronary artery bypass graft)      S/P aortic valve repair      S/P carotid endarterectomy          [This allergen will not trigger allergy alert] Originally Entered as [Unknown] reaction to [BEE STING] (Unknown)  No Known Drug Allergies      Meds:  atorvastatin 10 milliGRAM(s) Oral at bedtime  ciprofloxacin   IVPB 400 milliGRAM(s) IV Intermittent every 12 hours  fenofibrate Tablet 145 milliGRAM(s) Oral daily  finasteride 5 milliGRAM(s) Oral daily  insulin lispro (ADMELOG) corrective regimen sliding scale   SubCutaneous three times a day before meals  levETIRAcetam 500 milliGRAM(s) Oral two times a day  sodium chloride 0.45%. 1000 milliLiter(s) IV Continuous <Continuous>  tamsulosin 0.4 milliGRAM(s) Oral at bedtime  warfarin 2 milliGRAM(s) Oral once      SOCIAL HISTORY:  Smoker:  YES / NO        PACK YEARS:                         WHEN QUIT?  ETOH use:  YES / NO               FREQUENCY / QUANTITY:  Ilicit Drug use:  YES / NO  Occupation:  Assisted device use (Cane / Walker):  Live with:    FAMILY HISTORY:      VITALS:  Vital Signs Last 24 Hrs  T(C): 36.5 (16 Nov 2022 13:57), Max: 36.5 (16 Nov 2022 13:57)  T(F): 97.7 (16 Nov 2022 13:57), Max: 97.7 (16 Nov 2022 13:57)  HR: 66 (16 Nov 2022 13:57) (66 - 70)  BP: 162/72 (16 Nov 2022 13:57) (150/65 - 162/72)  BP(mean): --  RR: 18 (16 Nov 2022 13:57) (18 - 18)  SpO2: 97% (16 Nov 2022 13:57) (97% - 98%)    Parameters below as of 16 Nov 2022 13:57  Patient On (Oxygen Delivery Method): room air        LABS/DIAGNOSTIC TESTS:                          8.5    4.66  )-----------( 170      ( 16 Nov 2022 05:51 )             27.6     WBC Count: 4.66 K/uL (11-16 @ 05:51)  WBC Count: 7.14 K/uL (11-15 @ 06:30)  WBC Count: 3.39 K/uL (11-14 @ 04:35)      11-16    146<H>  |  116<H>  |  45<H>  ----------------------------<  114<H>  4.5   |  24  |  2.18<H>    Ca    8.5      16 Nov 2022 05:51  Phos  2.6     11-15  Mg     2.0     11-15    TPro  7.1  /  Alb  2.8<L>  /  TBili  0.3  /  DBili  x   /  AST  31  /  ALT  22  /  AlkPhos  76  11-15          LIVER FUNCTIONS - ( 15 Nov 2022 06:30 )  Alb: 2.8 g/dL / Pro: 7.1 g/dL / ALK PHOS: 76 U/L / ALT: 22 U/L DA / AST: 31 U/L / GGT: x             PT/INR - ( 16 Nov 2022 05:51 )   PT: 20.9 sec;   INR: 1.75 ratio         PTT - ( 15 Nov 2022 06:30 )  PTT:36.6 sec    LACTATE:    ABG -     CULTURES:   Clean Catch Clean Catch (Midstream)  11-14 @ 06:42   >100,000 CFU/ml Escherichia coli  >100,000 CFU/ml Enterococcus faecalis Susceptibility to follow.  --  Escherichia coli      .Blood Blood-Peripheral  11-14 @ 04:20   No growth to date.  --  --      .Blood Blood-Peripheral  11-14 @ 04:10   No growth to date.  --  --          RADIOLOGY:< from: Xray Chest 1 View- PORTABLE-Urgent (11.14.22 @ 05:08) >  ACC: [id number] EXAM:  XR CHEST PORTABLE URGENT 1V                          PROCEDURE DATE:  11/14/2022          INTERPRETATION:  INDICATION: Sepsis    PRIORS: 5/29/2019    VIEWS: Portable AP radiography of the chest performed. Evaluation limited   secondary to shallow inspiration, portable technique and patient   positioning.    FINDINGS: Heart size appears within normal limits. Status post   sternotomy. Surgical clips overlie the mediastinum. Tortuosity of the   thoracic aorta identified. No hilar abnormalities are identified. The   superior mediastinal region is obscured by the patient's chin/face. There   is no evidence for focal infiltrate, lobar consolidation or pulmonary   edema. Linear scarlike opacities left lung base. No pleural effusion or   pneumothorax is demonstrated. No mediastinal shift is noted. The   visualized osseous structures appear unremarkable.    IMPRESSION: No evidence for focal infiltrate or lobar consolidation.    --- End of Report ---            TESSIE LECHUGA MD; Attending Radiologist  This document has been electronically signed. Nov 14 2022 11:37AM    < end of copied text >        ROS  [  ] UNABLE TO ELICIT               HPI:  Patient is dysarthric secondary to CVA and dementia and this history was taken from his wife, Julissa Antunez ([phone], cell: [phone]) who is the care giver of the patient.    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 Patient's Sapp's catheter was placed during his last admission at [facility] for hypoglycemia and Urine culture confirmed Candida albicans UTI. Patient was treated with Diflucan 200 mg bid, 6 days of Ceftriaxone continued with 10-14 days of Cefpodoxime. His last Cefpodoxime dose was on 22OCT2022. His Sapp's catheter was changed in the beginning of November at the Urology office and plans to change a new one on 29NOV2022. Denied cough, vomiting, fever, or diarrhea.    In the ED, patient's BP was 185/83 mmHg which decreased to 95/53 mmHg. Patient was given IV  ml bolus. CMP, CBC, PT/INR, aPTT, UA, Urine culure, blood culture, EKG and CXR were obtained. UA is positive for RBCs, WBCs, leukocyte esterase, few bacteria, ketone protein, Hyaline cast and Glucose. Lactate was normal. Patient's Creatinine was 3.36. (2.01 in 2019). EKG showed wide QRS, nonspecific interventricular block and T wave abnormality. Patient is admitted to the Internal Medicine for UTI management.         History as above, asked to see this patient as he was found to have a UTI with a resistant E. Coli and Enterococcus. He had a Sapp catheter in place that he pulled out and was admitted with Hematuria after pulling out his sapp and was found to have a UTI. He has no sapp catheter in place at this time as he passed a trial of void. He is very confused and is just talking gibberish and gets combative when examined at times. He has no fevers or Leukocytosis. Unable to get any information from him at all.            PAST MEDICAL & SURGICAL HISTORY:  CVA (cerebral vascular accident)  (2015 and 2017)      BPH (benign prostatic hyperplasia)      Diabetes      Seizure      HLD (hyperlipidemia)      Unilateral paralysis due to acute cerebrovascular accident (CVA)      Stage 4 chronic kidney disease      Bilateral hydronephrosis      S/P CABG (coronary artery bypass graft)      S/P aortic valve repair      S/P carotid endarterectomy          [This allergen will not trigger allergy alert] Originally Entered as [Unknown] reaction to [BEE STING] (Unknown)  No Known Drug Allergies      Meds:  atorvastatin 10 milliGRAM(s) Oral at bedtime  ciprofloxacin   IVPB 400 milliGRAM(s) IV Intermittent every 12 hours  fenofibrate Tablet 145 milliGRAM(s) Oral daily  finasteride 5 milliGRAM(s) Oral daily  insulin lispro (ADMELOG) corrective regimen sliding scale   SubCutaneous three times a day before meals  levETIRAcetam 500 milliGRAM(s) Oral two times a day  sodium chloride 0.45%. 1000 milliLiter(s) IV Continuous <Continuous>  tamsulosin 0.4 milliGRAM(s) Oral at bedtime  warfarin 2 milliGRAM(s) Oral once      SOCIAL HISTORY: unknown    FAMILY HISTORY: unknown      VITALS:  Vital Signs Last 24 Hrs  T(C): 36.5 (16 Nov 2022 13:57), Max: 36.5 (16 Nov 2022 13:57)  T(F): 97.7 (16 Nov 2022 13:57), Max: 97.7 (16 Nov 2022 13:57)  HR: 66 (16 Nov 2022 13:57) (66 - 70)  BP: 162/72 (16 Nov 2022 13:57) (150/65 - 162/72)  BP(mean): --  RR: 18 (16 Nov 2022 13:57) (18 - 18)  SpO2: 97% (16 Nov 2022 13:57) (97% - 98%)    Parameters below as of 16 Nov 2022 13:57  Patient On (Oxygen Delivery Method): room air        LABS/DIAGNOSTIC TESTS:                          8.5    4.66  )-----------( 170      ( 16 Nov 2022 05:51 )             27.6     WBC Count: 4.66 K/uL (11-16 @ 05:51)  WBC Count: 7.14 K/uL (11-15 @ 06:30)  WBC Count: 3.39 K/uL (11-14 @ 04:35)      11-16    146<H>  |  116<H>  |  45<H>  ----------------------------<  114<H>  4.5   |  24  |  2.18<H>    Ca    8.5      16 Nov 2022 05:51  Phos  2.6     11-15  Mg     2.0     11-15    TPro  7.1  /  Alb  2.8<L>  /  TBili  0.3  /  DBili  x   /  AST  31  /  ALT  22  /  AlkPhos  76  11-15          LIVER FUNCTIONS - ( 15 Nov 2022 06:30 )  Alb: 2.8 g/dL / Pro: 7.1 g/dL / ALK PHOS: 76 U/L / ALT: 22 U/L DA / AST: 31 U/L / GGT: x             PT/INR - ( 16 Nov 2022 05:51 )   PT: 20.9 sec;   INR: 1.75 ratio         PTT - ( 15 Nov 2022 06:30 )  PTT:36.6 sec    LACTATE:    ABG -     CULTURES:   Clean Catch Clean Catch (Midstream)  11-14 @ 06:42   >100,000 CFU/ml Escherichia coli  >100,000 CFU/ml Enterococcus faecalis Susceptibility to follow.  --  Escherichia coli      .Blood Blood-Peripheral  11-14 @ 04:20   No growth to date.  --  --      .Blood Blood-Peripheral  11-14 @ 04:10   No growth to date.  --  --          RADIOLOGY:< from: Xray Chest 1 View- PORTABLE-Urgent (11.14.22 @ 05:08) >  ACC: [id number] EXAM:  XR CHEST PORTABLE URGENT 1V                          PROCEDURE DATE:  11/14/2022          INTERPRETATION:  INDICATION: Sepsis    PRIORS: 5/29/2019    VIEWS: Portable AP radiography of the chest performed. Evaluation limited   secondary to shallow inspiration, portable technique and patient   positioning.    FINDINGS: Heart size appears within normal limits. Status post   sternotomy. Surgical clips overlie the mediastinum. Tortuosity of the   thoracic aorta identified. No hilar abnormalities are identified. The   superior mediastinal region is obscured by the patient's chin/face. There   is no evidence for focal infiltrate, lobar consolidation or pulmonary   edema. Linear scarlike opacities left lung base. No pleural effusion or   pneumothorax is demonstrated. No mediastinal shift is noted. The   visualized osseous structures appear unremarkable.    IMPRESSION: No evidence for focal infiltrate or lobar consolidation.    --- End of Report ---            TESSIE LECHUGA MD; Attending Radiologist  This document has been electronically signed. Nov 14 2022 11:37AM    < end of copied text >        ROS  [ x ] UNABLE TO ELICIT

## 2022-11-16 NOTE — PROGRESS NOTE ADULT - PROBLEM SELECTOR PLAN 2
Patient's creatinine level was 3.36. (2.01 in 2019)  Per patient's wife, his creatinine level improved after Diaz's catheter placement. Contacted patient's PCP office to get baseline Creatinine but the office is closed Monday. (PCP. Dr. Manoj Chacon, Ph: [phone])  Dose medication based on CrCl.  Avoid Nephrotoxins and NSAIDs. - Patient's creatinine level was 3.36. (2.01 in 2019)  - Per patient's wife, his creatinine level improved after Diaz's catheter placement. Contacted patient's PCP office to get baseline Creatinine but the office is closed Monday. (PCP. Dr. Manoj Chacon, Ph: [phone])  - Dose medication based on CrCl.  - Avoid Nephrotoxins and NSAIDs.  - Improving 3.36>2.5>2.18

## 2022-11-16 NOTE — CONSULT NOTE ADULT - ASSESSMENT
UTI    Plan - decrease Cipro to 200mgs iv q12hrs  Start vanco 1 gm iv q24hrs  await sensitivities of Enterococcus. UTI    Plan - decrease Cipro to 200mgs iv q12hrs  Start vanco 1 gm iv x 1 dose  await sensitivities of Enterococcus.

## 2022-11-16 NOTE — PROGRESS NOTE ADULT - PROBLEM SELECTOR PLAN 1
Patient has recent history of Candida albicans UTI in October and was treated with Diflucan, as well as Ceftriaxone and Cefpodoxime.  Patient is afebrile.  Rigid hard abdomen on umbilical and suprapubic areas, concerning for stool impaction or masses. Consider obtaining imaging.  WBC 3.39.   UA is positive for RBCs, WBCs, leukocyte esterase in ED. 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  Pending Urine culture and blood cultures.   1/ 3 on SIR criteria and 1-2 points on qSOFA (as cannot access patient's mental status)   continue on Ceftriaxone for now  change antibiotics accordingly based on the culture and sensitivity - Patient has recent history of Candida albicans UTI in October and was treated with Diflucan, as well as Ceftriaxone and Cefpodoxime.  - Patient is afebrile.  - Rigid hard abdomen on umbilical and suprapubic areas, concerning for stool impaction or masses. Consider obtaining imaging.  - WBC 3.39.   - UA is positive for RBCs, WBCs, leukocyte esterase in ED. 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  - Pending blood cultures.   - Urine Cultures - E. Coli and Enteroccoi  - E. Coli resistance to ceftriaxone  - Start on Cipro for now  - 1/ 3 on SIR criteria and 1-2 points on qSOFA (as cannot access patient's mental status)   - change antibiotics accordingly based on the culture and sensitivity  - Consult ID Dr. Alva

## 2022-11-16 NOTE — PROGRESS NOTE ADULT - SUBJECTIVE AND OBJECTIVE BOX
PGY-1 Progress Note discussed with attending      PLEASE CONTACT ON CALL TEAM:  - On Call Team (Please refer to Paul) FROM 5:00 PM - 8:30PM  - Nightfloat Team FROM 8:30 -7:30 AM    INTERVAL HPI/OVERNIGHT EVENTS:       REVIEW OF SYSTEMS:  CONSTITUTIONAL: No fever, weight loss, or fatigue  RESPIRATORY: No cough, wheezing, chills or hemoptysis; No shortness of breath  CARDIOVASCULAR: No chest pain, palpitations, dizziness, or leg swelling  GASTROINTESTINAL: No abdominal pain. No nausea, vomiting, or hematemesis; No diarrhea or constipation. No melena or hematochezia.  GENITOURINARY: No dysuria or hematuria, urinary frequency  NEUROLOGICAL: No headaches, memory loss, loss of strength, numbness, or tremors  SKIN: No itching, burning, rashes, or lesions     MEDICATIONS  (STANDING):  atorvastatin 10 milliGRAM(s) Oral at bedtime  cefTRIAXone   IVPB 1000 milliGRAM(s) IV Intermittent every 24 hours  fenofibrate Tablet 145 milliGRAM(s) Oral daily  finasteride 5 milliGRAM(s) Oral daily  insulin lispro (ADMELOG) corrective regimen sliding scale   SubCutaneous three times a day before meals  levETIRAcetam 500 milliGRAM(s) Oral two times a day  sodium chloride 0.45%. 1000 milliLiter(s) (60 mL/Hr) IV Continuous <Continuous>  tamsulosin 0.4 milliGRAM(s) Oral at bedtime    MEDICATIONS  (PRN):      Vital Signs Last 24 Hrs  T(C): 36.3 (15 Nov 2022 21:31), Max: 36.4 (15 Nov 2022 08:09)  T(F): 97.3 (15 Nov 2022 21:31), Max: 97.6 (15 Nov 2022 08:09)  HR: 70 (15 Nov 2022 21:31) (61 - 81)  BP: 150/65 (15 Nov 2022 21:31) (134/53 - 192/71)  BP(mean): --  RR: 18 (15 Nov 2022 21:31) (18 - 18)  SpO2: 98% (15 Nov 2022 21:31) (96% - 98%)    Parameters below as of 15 Nov 2022 21:31  Patient On (Oxygen Delivery Method): room air        PHYSICAL EXAMINATION:  GENERAL: NAD, well built  HEAD:  Atraumatic, Normocephalic  EYES:  conjunctiva and sclera clear  NECK: Supple, No JVD, Normal thyroid  CHEST/LUNG: Clear to auscultation. Clear to percussion bilaterally; No rales, rhonchi, wheezing, or rubs  HEART: Regular rate and rhythm; No murmurs, rubs, or gallops  ABDOMEN: Soft, Nontender, Nondistended; Bowel sounds present, no pain or masses on palpation  NERVOUS SYSTEM:  Alert & Oriented X3  : voiding well  EXTREMITIES:  2+ Peripheral Pulses, No clubbing, cyanosis, or edema  SKIN: warm dry                          8.5    4.66  )-----------( 170      ( 16 Nov 2022 05:51 )             27.6     11-16    146<H>  |  116<H>  |  45<H>  ----------------------------<  114<H>  4.5   |  24  |  2.18<H>    Ca    8.5      16 Nov 2022 05:51  Phos  2.6     11-15  Mg     2.0     11-15    TPro  7.1  /  Alb  2.8<L>  /  TBili  0.3  /  DBili  x   /  AST  31  /  ALT  22  /  AlkPhos  76  11-15    LIVER FUNCTIONS - ( 15 Nov 2022 06:30 )  Alb: 2.8 g/dL / Pro: 7.1 g/dL / ALK PHOS: 76 U/L / ALT: 22 U/L DA / AST: 31 U/L / GGT: x               PT/INR - ( 16 Nov 2022 05:51 )   PT: 20.9 sec;   INR: 1.75 ratio         PTT - ( 15 Nov 2022 06:30 )  PTT:36.6 sec    I&O's Summary    15 Nov 2022 07:01  -  16 Nov 2022 07:00  --------------------------------------------------------  IN: 0 mL / OUT: 400 mL / NET: -400 mL          Culture - Urine (collected 14 Nov 2022 06:42)  Source: Clean Catch Clean Catch (Midstream)  Preliminary Report (15 Nov 2022 09:27):    >100,000 CFU/ml Escherichia coli    Culture - Blood (collected 14 Nov 2022 04:20)  Source: .Blood Blood-Peripheral  Preliminary Report (15 Nov 2022 09:01):    No growth to date.    Culture - Blood (collected 14 Nov 2022 04:10)  Source: .Blood Blood-Peripheral  Preliminary Report (15 Nov 2022 09:01):    No growth to date.        CAPILLARY BLOOD GLUCOSE      RADIOLOGY & ADDITIONAL TESTS:                   PGY-1 Progress Note discussed with attending      PLEASE CONTACT ON CALL TEAM:  - On Call Team (Please refer to Paul) FROM 5:00 PM - 8:30PM  - Nightfloat Team FROM 8:30 -7:30 AM    INTERVAL HPI/OVERNIGHT EVENTS: No acute events overnight.  Patient examined at bedside this AM.  Patient is       REVIEW OF SYSTEMS:  CONSTITUTIONAL: No fever, weight loss, or fatigue  RESPIRATORY: No cough, wheezing, chills or hemoptysis; No shortness of breath  CARDIOVASCULAR: No chest pain, palpitations, dizziness, or leg swelling  GASTROINTESTINAL: No abdominal pain. No nausea, vomiting, or hematemesis; No diarrhea or constipation. No melena or hematochezia.  GENITOURINARY: No dysuria or hematuria, urinary frequency  NEUROLOGICAL: No headaches, memory loss, loss of strength, numbness, or tremors  SKIN: No itching, burning, rashes, or lesions     MEDICATIONS  (STANDING):  atorvastatin 10 milliGRAM(s) Oral at bedtime  cefTRIAXone   IVPB 1000 milliGRAM(s) IV Intermittent every 24 hours  fenofibrate Tablet 145 milliGRAM(s) Oral daily  finasteride 5 milliGRAM(s) Oral daily  insulin lispro (ADMELOG) corrective regimen sliding scale   SubCutaneous three times a day before meals  levETIRAcetam 500 milliGRAM(s) Oral two times a day  sodium chloride 0.45%. 1000 milliLiter(s) (60 mL/Hr) IV Continuous <Continuous>  tamsulosin 0.4 milliGRAM(s) Oral at bedtime    MEDICATIONS  (PRN):      Vital Signs Last 24 Hrs  T(C): 36.3 (15 Nov 2022 21:31), Max: 36.4 (15 Nov 2022 08:09)  T(F): 97.3 (15 Nov 2022 21:31), Max: 97.6 (15 Nov 2022 08:09)  HR: 70 (15 Nov 2022 21:31) (61 - 81)  BP: 150/65 (15 Nov 2022 21:31) (134/53 - 192/71)  BP(mean): --  RR: 18 (15 Nov 2022 21:31) (18 - 18)  SpO2: 98% (15 Nov 2022 21:31) (96% - 98%)    Parameters below as of 15 Nov 2022 21:31  Patient On (Oxygen Delivery Method): room air        PHYSICAL EXAMINATION:  GENERAL: NAD, well built  HEAD:  Atraumatic, Normocephalic  EYES:  conjunctiva and sclera clear  NECK: Supple, No JVD, Normal thyroid  CHEST/LUNG: Clear to auscultation. Clear to percussion bilaterally; No rales, rhonchi, wheezing, or rubs  HEART: Regular rate and rhythm; No murmurs, rubs, or gallops  ABDOMEN: Soft, Nontender, Nondistended; Bowel sounds present, no pain or masses on palpation  NERVOUS SYSTEM:  Alert & Oriented X3  : voiding well  EXTREMITIES:  2+ Peripheral Pulses, No clubbing, cyanosis, or edema  SKIN: warm dry                          8.5    4.66  )-----------( 170      ( 16 Nov 2022 05:51 )             27.6     11-16    146<H>  |  116<H>  |  45<H>  ----------------------------<  114<H>  4.5   |  24  |  2.18<H>    Ca    8.5      16 Nov 2022 05:51  Phos  2.6     11-15  Mg     2.0     11-15    TPro  7.1  /  Alb  2.8<L>  /  TBili  0.3  /  DBili  x   /  AST  31  /  ALT  22  /  AlkPhos  76  11-15    LIVER FUNCTIONS - ( 15 Nov 2022 06:30 )  Alb: 2.8 g/dL / Pro: 7.1 g/dL / ALK PHOS: 76 U/L / ALT: 22 U/L DA / AST: 31 U/L / GGT: x               PT/INR - ( 16 Nov 2022 05:51 )   PT: 20.9 sec;   INR: 1.75 ratio         PTT - ( 15 Nov 2022 06:30 )  PTT:36.6 sec    I&O's Summary    15 Nov 2022 07:01  -  16 Nov 2022 07:00  --------------------------------------------------------  IN: 0 mL / OUT: 400 mL / NET: -400 mL          Culture - Urine (collected 14 Nov 2022 06:42)  Source: Clean Catch Clean Catch (Midstream)  Preliminary Report (15 Nov 2022 09:27):    >100,000 CFU/ml Escherichia coli    Culture - Blood (collected 14 Nov 2022 04:20)  Source: .Blood Blood-Peripheral  Preliminary Report (15 Nov 2022 09:01):    No growth to date.    Culture - Blood (collected 14 Nov 2022 04:10)  Source: .Blood Blood-Peripheral  Preliminary Report (15 Nov 2022 09:01):    No growth to date.        CAPILLARY BLOOD GLUCOSE      RADIOLOGY & ADDITIONAL TESTS:                   PGY-1 Progress Note discussed with attending      PLEASE CONTACT ON CALL TEAM:  - On Call Team (Please refer to Paul) FROM 5:00 PM - 8:30PM  - Nightfloat Team FROM 8:30 -7:30 AM    INTERVAL HPI/OVERNIGHT EVENTS: No acute events overnight.  Patient examined at bedside this AM. Patients lying in bed, unable to answer questions.      REVIEW OF SYSTEMS:  Unable to assess given patients mental status     MEDICATIONS  (STANDING):  atorvastatin 10 milliGRAM(s) Oral at bedtime  cefTRIAXone   IVPB 1000 milliGRAM(s) IV Intermittent every 24 hours  fenofibrate Tablet 145 milliGRAM(s) Oral daily  finasteride 5 milliGRAM(s) Oral daily  insulin lispro (ADMELOG) corrective regimen sliding scale   SubCutaneous three times a day before meals  levETIRAcetam 500 milliGRAM(s) Oral two times a day  sodium chloride 0.45%. 1000 milliLiter(s) (60 mL/Hr) IV Continuous <Continuous>  tamsulosin 0.4 milliGRAM(s) Oral at bedtime    MEDICATIONS  (PRN):      Vital Signs Last 24 Hrs  T(C): 36.3 (15 Nov 2022 21:31), Max: 36.4 (15 Nov 2022 08:09)  T(F): 97.3 (15 Nov 2022 21:31), Max: 97.6 (15 Nov 2022 08:09)  HR: 70 (15 Nov 2022 21:31) (61 - 81)  BP: 150/65 (15 Nov 2022 21:31) (134/53 - 192/71)  BP(mean): --  RR: 18 (15 Nov 2022 21:31) (18 - 18)  SpO2: 98% (15 Nov 2022 21:31) (96% - 98%)    Parameters below as of 15 Nov 2022 21:31  Patient On (Oxygen Delivery Method): room air        PHYSICAL EXAM WAS LIMITED AS PATIENT HAS DEMENTIA.  CONSTITUTIONAL: Well groomed, no apparent distress, cachexic  EYES: Pupils are equal, about 2 mm in size and no reaction to lights. Ecchymosis and scratched mauricio noted on the infraorbital skin. No conjunctival or scleral injection, non-icteric  NECK: Supple, symmetric and without tracheal deviation   RESP: No respiratory distress, no use of accessory muscles; CTA b/l, no WRR  CV: RRR, +S1S2, no MRG; no JVD; no peripheral edema  GI:  Rigid/hard to palpate on periumbilical and suprapubic areas. NT, ND, no rebound, no guarding; no palpable masses; no hepatosplenomegaly  Genital: blood noted at the urethral meatus and on the gland penis. Diaz's in place. Clear urine with blood clots and cast noted in the line.   SKIN: Echymosis and scratched mauricio noted on the skin below left eye. No rashes or ulcers noted; no subcutaneous nodules or induration palpable. No pressure ulcer noted.   NEURO: Decorticate posture noted on the right side with muscle rigidity on the right upper extremity.   PSYCH: Patient is non-verbal.                        8.5    4.66  )-----------( 170      ( 16 Nov 2022 05:51 )             27.6     11-16    146<H>  |  116<H>  |  45<H>  ----------------------------<  114<H>  4.5   |  24  |  2.18<H>    Ca    8.5      16 Nov 2022 05:51  Phos  2.6     11-15  Mg     2.0     11-15    TPro  7.1  /  Alb  2.8<L>  /  TBili  0.3  /  DBili  x   /  AST  31  /  ALT  22  /  AlkPhos  76  11-15    LIVER FUNCTIONS - ( 15 Nov 2022 06:30 )  Alb: 2.8 g/dL / Pro: 7.1 g/dL / ALK PHOS: 76 U/L / ALT: 22 U/L DA / AST: 31 U/L / GGT: x               PT/INR - ( 16 Nov 2022 05:51 )   PT: 20.9 sec;   INR: 1.75 ratio         PTT - ( 15 Nov 2022 06:30 )  PTT:36.6 sec    I&O's Summary    15 Nov 2022 07:01  -  16 Nov 2022 07:00  --------------------------------------------------------  IN: 0 mL / OUT: 400 mL / NET: -400 mL          Culture - Urine (collected 14 Nov 2022 06:42)  Source: Clean Catch Clean Catch (Midstream)  Preliminary Report (15 Nov 2022 09:27):    >100,000 CFU/ml Escherichia coli    Culture - Blood (collected 14 Nov 2022 04:20)  Source: .Blood Blood-Peripheral  Preliminary Report (15 Nov 2022 09:01):    No growth to date.    Culture - Blood (collected 14 Nov 2022 04:10)  Source: .Blood Blood-Peripheral  Preliminary Report (15 Nov 2022 09:01):    No growth to date.        CAPILLARY BLOOD GLUCOSE      RADIOLOGY & ADDITIONAL TESTS:

## 2022-11-17 LAB
-  AMPICILLIN: SIGNIFICANT CHANGE UP
-  CIPROFLOXACIN: SIGNIFICANT CHANGE UP
-  LEVOFLOXACIN: SIGNIFICANT CHANGE UP
-  NITROFURANTOIN: SIGNIFICANT CHANGE UP
-  TETRACYCLINE: SIGNIFICANT CHANGE UP
-  VANCOMYCIN: SIGNIFICANT CHANGE UP
ANION GAP SERPL CALC-SCNC: 5 MMOL/L — SIGNIFICANT CHANGE UP (ref 5–17)
BLD GP AB SCN SERPL QL: SIGNIFICANT CHANGE UP
BUN SERPL-MCNC: 41 MG/DL — HIGH (ref 7–18)
CALCIUM SERPL-MCNC: 9.2 MG/DL — SIGNIFICANT CHANGE UP (ref 8.4–10.5)
CHLORIDE SERPL-SCNC: 114 MMOL/L — HIGH (ref 96–108)
CO2 SERPL-SCNC: 26 MMOL/L — SIGNIFICANT CHANGE UP (ref 22–31)
CREAT SERPL-MCNC: 2.17 MG/DL — HIGH (ref 0.5–1.3)
CULTURE RESULTS: SIGNIFICANT CHANGE UP
EGFR: 29 ML/MIN/1.73M2 — LOW
GLUCOSE BLDC GLUCOMTR-MCNC: 114 MG/DL — HIGH (ref 70–99)
GLUCOSE BLDC GLUCOMTR-MCNC: 139 MG/DL — HIGH (ref 70–99)
GLUCOSE BLDC GLUCOMTR-MCNC: 149 MG/DL — HIGH (ref 70–99)
GLUCOSE BLDC GLUCOMTR-MCNC: 191 MG/DL — HIGH (ref 70–99)
GLUCOSE SERPL-MCNC: 133 MG/DL — HIGH (ref 70–99)
HCT VFR BLD CALC: 28.5 % — LOW (ref 39–50)
HGB BLD-MCNC: 8.8 G/DL — LOW (ref 13–17)
MCHC RBC-ENTMCNC: 27.1 PG — SIGNIFICANT CHANGE UP (ref 27–34)
MCHC RBC-ENTMCNC: 30.9 GM/DL — LOW (ref 32–36)
MCV RBC AUTO: 87.7 FL — SIGNIFICANT CHANGE UP (ref 80–100)
METHOD TYPE: SIGNIFICANT CHANGE UP
NRBC # BLD: 0 /100 WBCS — SIGNIFICANT CHANGE UP (ref 0–0)
ORGANISM # SPEC MICROSCOPIC CNT: SIGNIFICANT CHANGE UP
PLATELET # BLD AUTO: 173 K/UL — SIGNIFICANT CHANGE UP (ref 150–400)
POTASSIUM SERPL-MCNC: 4.6 MMOL/L — SIGNIFICANT CHANGE UP (ref 3.5–5.3)
POTASSIUM SERPL-SCNC: 4.6 MMOL/L — SIGNIFICANT CHANGE UP (ref 3.5–5.3)
RBC # BLD: 3.25 M/UL — LOW (ref 4.2–5.8)
RBC # FLD: 14.8 % — HIGH (ref 10.3–14.5)
SODIUM SERPL-SCNC: 145 MMOL/L — SIGNIFICANT CHANGE UP (ref 135–145)
SPECIMEN SOURCE: SIGNIFICANT CHANGE UP
WBC # BLD: 4.6 K/UL — SIGNIFICANT CHANGE UP (ref 3.8–10.5)
WBC # FLD AUTO: 4.6 K/UL — SIGNIFICANT CHANGE UP (ref 3.8–10.5)

## 2022-11-17 RX ORDER — WARFARIN SODIUM 2.5 MG/1
2 TABLET ORAL ONCE
Refills: 0 | Status: COMPLETED | OUTPATIENT
Start: 2022-11-17 | End: 2022-11-17

## 2022-11-17 RX ORDER — AMLODIPINE BESYLATE 2.5 MG/1
5 TABLET ORAL ONCE
Refills: 0 | Status: COMPLETED | OUTPATIENT
Start: 2022-11-17 | End: 2022-11-17

## 2022-11-17 RX ORDER — MEROPENEM 1 G/30ML
500 INJECTION INTRAVENOUS EVERY 12 HOURS
Refills: 0 | Status: DISCONTINUED | OUTPATIENT
Start: 2022-11-17 | End: 2022-11-19

## 2022-11-17 RX ADMIN — ATORVASTATIN CALCIUM 10 MILLIGRAM(S): 80 TABLET, FILM COATED ORAL at 21:26

## 2022-11-17 RX ADMIN — TAMSULOSIN HYDROCHLORIDE 0.4 MILLIGRAM(S): 0.4 CAPSULE ORAL at 21:26

## 2022-11-17 RX ADMIN — AMLODIPINE BESYLATE 5 MILLIGRAM(S): 2.5 TABLET ORAL at 23:23

## 2022-11-17 RX ADMIN — LEVETIRACETAM 500 MILLIGRAM(S): 250 TABLET, FILM COATED ORAL at 17:42

## 2022-11-17 RX ADMIN — Medication 100 MILLIGRAM(S): at 05:56

## 2022-11-17 RX ADMIN — WARFARIN SODIUM 2 MILLIGRAM(S): 2.5 TABLET ORAL at 21:26

## 2022-11-17 RX ADMIN — SODIUM CHLORIDE 60 MILLILITER(S): 9 INJECTION, SOLUTION INTRAVENOUS at 12:10

## 2022-11-17 RX ADMIN — Medication 1: at 17:41

## 2022-11-17 RX ADMIN — MEROPENEM 100 MILLIGRAM(S): 1 INJECTION INTRAVENOUS at 21:27

## 2022-11-17 RX ADMIN — FINASTERIDE 5 MILLIGRAM(S): 5 TABLET, FILM COATED ORAL at 12:10

## 2022-11-17 RX ADMIN — SODIUM CHLORIDE 60 MILLILITER(S): 9 INJECTION, SOLUTION INTRAVENOUS at 23:56

## 2022-11-17 RX ADMIN — Medication 145 MILLIGRAM(S): at 12:10

## 2022-11-17 RX ADMIN — LEVETIRACETAM 500 MILLIGRAM(S): 250 TABLET, FILM COATED ORAL at 05:56

## 2022-11-17 RX ADMIN — MEROPENEM 100 MILLIGRAM(S): 1 INJECTION INTRAVENOUS at 17:42

## 2022-11-17 NOTE — PROGRESS NOTE ADULT - PROBLEM SELECTOR PLAN 1
- Patient has recent history of Candida albicans UTI in October and was treated with Diflucan, as well as Ceftriaxone and Cefpodoxime.  - Patient is afebrile.  - Rigid hard abdomen on umbilical and suprapubic areas, concerning for stool impaction or masses. Consider obtaining imaging.  - WBC 3.39.   - UA is positive for RBCs, WBCs, leukocyte esterase in ED. 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  - Pending blood cultures.   - Urine Cultures - E. Coli and Enteroccoi  - E. Coli resistance to ceftriaxone  - Start on Cipro for now  - 1/ 3 on SIR criteria and 1-2 points on qSOFA (as cannot access patient's mental status)   - change antibiotics accordingly based on the culture and sensitivity  - Consult ID Dr. Alva - Patient has recent history of Candida albicans UTI in October and was treated with Diflucan, as well as Ceftriaxone and Cefpodoxime.  - Patient is afebrile.  - Rigid hard abdomen on umbilical and suprapubic areas, concerning for stool impaction or masses. Consider obtaining imaging.  - WBC 3.39.   - UA is positive for RBCs, WBCs, leukocyte esterase in ED. RBCs is likely from urethral injury from self removal of Sapp's.  - Pending blood cultures.   - Urine Cultures - E. Coli and Enteroccoi  - E. Coli resistance to ceftriaxone  - Start on Meropenum Per ID  - 1/ 3 on SIR criteria and 1-2 points on qSOFA (as cannot access patient's mental status)   - change antibiotics accordingly based on the culture and sensitivity  - F/U Bladder scan  - Consult ID Dr. Alva  - Consulted Urology - possible sapp replacement

## 2022-11-17 NOTE — PROGRESS NOTE ADULT - SUBJECTIVE AND OBJECTIVE BOX
PGY-1 Progress Note discussed with attending      PLEASE CONTACT ON CALL TEAM:  - On Call Team (Please refer to Paul) FROM 5:00 PM - 8:30PM  - Nightfloat Team FROM 8:30 -7:30 AM    INTERVAL HPI/OVERNIGHT EVENTS:       REVIEW OF SYSTEMS:  CONSTITUTIONAL: No fever, weight loss, or fatigue  RESPIRATORY: No cough, wheezing, chills or hemoptysis; No shortness of breath  CARDIOVASCULAR: No chest pain, palpitations, dizziness, or leg swelling  GASTROINTESTINAL: No abdominal pain. No nausea, vomiting, or hematemesis; No diarrhea or constipation. No melena or hematochezia.  GENITOURINARY: No dysuria or hematuria, urinary frequency  NEUROLOGICAL: No headaches, memory loss, loss of strength, numbness, or tremors  SKIN: No itching, burning, rashes, or lesions     MEDICATIONS  (STANDING):  atorvastatin 10 milliGRAM(s) Oral at bedtime  ciprofloxacin   IVPB 200 milliGRAM(s) IV Intermittent every 12 hours  fenofibrate Tablet 145 milliGRAM(s) Oral daily  finasteride 5 milliGRAM(s) Oral daily  insulin lispro (ADMELOG) corrective regimen sliding scale   SubCutaneous three times a day before meals  levETIRAcetam 500 milliGRAM(s) Oral two times a day  sodium chloride 0.45%. 1000 milliLiter(s) (60 mL/Hr) IV Continuous <Continuous>  tamsulosin 0.4 milliGRAM(s) Oral at bedtime    MEDICATIONS  (PRN):      Vital Signs Last 24 Hrs  T(C): 36.2 (17 Nov 2022 05:06), Max: 36.5 (16 Nov 2022 13:57)  T(F): 97.2 (17 Nov 2022 05:06), Max: 97.7 (16 Nov 2022 13:57)  HR: 61 (17 Nov 2022 05:30) (61 - 81)  BP: 163/74 (17 Nov 2022 05:30) (138/57 - 194/80)  BP(mean): --  RR: 18 (17 Nov 2022 05:30) (17 - 18)  SpO2: 96% (17 Nov 2022 05:30) (96% - 99%)    Parameters below as of 17 Nov 2022 05:30  Patient On (Oxygen Delivery Method): room air        PHYSICAL EXAMINATION:  GENERAL: NAD, well built  HEAD:  Atraumatic, Normocephalic  EYES:  conjunctiva and sclera clear  NECK: Supple, No JVD, Normal thyroid  CHEST/LUNG: Clear to auscultation. Clear to percussion bilaterally; No rales, rhonchi, wheezing, or rubs  HEART: Regular rate and rhythm; No murmurs, rubs, or gallops  ABDOMEN: Soft, Nontender, Nondistended; Bowel sounds present, no pain or masses on palpation  NERVOUS SYSTEM:  Alert & Oriented X3  : voiding well  EXTREMITIES:  2+ Peripheral Pulses, No clubbing, cyanosis, or edema  SKIN: warm dry                          8.5    4.66  )-----------( 170      ( 16 Nov 2022 05:51 )             27.6     11-16    146<H>  |  116<H>  |  45<H>  ----------------------------<  114<H>  4.5   |  24  |  2.18<H>    Ca    8.5      16 Nov 2022 05:51            PT/INR - ( 16 Nov 2022 05:51 )   PT: 20.9 sec;   INR: 1.75 ratio             I&O's Summary    16 Nov 2022 07:01  -  17 Nov 2022 07:00  --------------------------------------------------------  IN: 600 mL / OUT: 800 mL / NET: -200 mL            CAPILLARY BLOOD GLUCOSE      RADIOLOGY & ADDITIONAL TESTS:                   PGY-1 Progress Note discussed with attending      PLEASE CONTACT ON CALL TEAM:  - On Call Team (Please refer to Paul) FROM 5:00 PM - 8:30PM  - Nightfloat Team FROM 8:30 -7:30 AM    INTERVAL HPI/OVERNIGHT EVENTS: Overnight patient began voiding.  Patient examined at bedside this AM.  Patient lying in bed, A&Ox0.  Patient has been voiding intermittently through the day       REVIEW OF SYSTEMS:  Unable to assess due to patients mental stauts    MEDICATIONS  (STANDING):  atorvastatin 10 milliGRAM(s) Oral at bedtime  ciprofloxacin   IVPB 200 milliGRAM(s) IV Intermittent every 12 hours  fenofibrate Tablet 145 milliGRAM(s) Oral daily  finasteride 5 milliGRAM(s) Oral daily  insulin lispro (ADMELOG) corrective regimen sliding scale   SubCutaneous three times a day before meals  levETIRAcetam 500 milliGRAM(s) Oral two times a day  sodium chloride 0.45%. 1000 milliLiter(s) (60 mL/Hr) IV Continuous <Continuous>  tamsulosin 0.4 milliGRAM(s) Oral at bedtime    MEDICATIONS  (PRN):      Vital Signs Last 24 Hrs  T(C): 36.2 (17 Nov 2022 05:06), Max: 36.5 (16 Nov 2022 13:57)  T(F): 97.2 (17 Nov 2022 05:06), Max: 97.7 (16 Nov 2022 13:57)  HR: 61 (17 Nov 2022 05:30) (61 - 81)  BP: 163/74 (17 Nov 2022 05:30) (138/57 - 194/80)  BP(mean): --  RR: 18 (17 Nov 2022 05:30) (17 - 18)  SpO2: 96% (17 Nov 2022 05:30) (96% - 99%)    Parameters below as of 17 Nov 2022 05:30  Patient On (Oxygen Delivery Method): room air        PHYSICAL EXAMINATION:  CONSTITUTIONAL: Well groomed, no apparent distress, cachexic  EYES: Pupils are equal, about 2 mm in size and no reaction to lights. Ecchymosis and scratched mauricio noted on the infraorbital skin. No conjunctival or scleral injection, non-icteric  NECK: Supple, symmetric and without tracheal deviation   RESP: No respiratory distress, no use of accessory muscles; CTA b/l, no WRR  CV: RRR, +S1S2, no MRG; no JVD; no peripheral edema  GI:  Rigid/hard to palpate on periumbilical and suprapubic areas. NT, ND, no rebound, no guarding; no palpable masses; no hepatosplenomegaly  Genital: blood noted at the urethral meatus and on the gland penis. Diaz's in place. Clear urine with blood clots and cast noted in the line.   SKIN: Echymosis and scratched mauricio noted on the skin below left eye. No rashes or ulcers noted; no subcutaneous nodules or induration palpable. No pressure ulcer noted.   NEURO: Decorticate posture noted on the right side with muscle rigidity on the right upper extremity.   PSYCH: Patient is non-verbal.                          8.5    4.66  )-----------( 170      ( 16 Nov 2022 05:51 )             27.6     11-16    146<H>  |  116<H>  |  45<H>  ----------------------------<  114<H>  4.5   |  24  |  2.18<H>    Ca    8.5      16 Nov 2022 05:51            PT/INR - ( 16 Nov 2022 05:51 )   PT: 20.9 sec;   INR: 1.75 ratio             I&O's Summary    16 Nov 2022 07:01  -  17 Nov 2022 07:00  --------------------------------------------------------  IN: 600 mL / OUT: 800 mL / NET: -200 mL            CAPILLARY BLOOD GLUCOSE      RADIOLOGY & ADDITIONAL TESTS:

## 2022-11-17 NOTE — CONSULT NOTE ADULT - SUBJECTIVE AND OBJECTIVE BOX
HPI:  Patient is dysarthric secondary to CVA and dementia and this history was taken from his wife, Julissa Antunez ([phone], cell: [phone]) who is the care giver of the patient.    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 Patient's Diaz's catheter was placed during his last admission at [facility] for hypoglycemia and Urine culture confirmed Candida albicans UTI. Patient was treated with Diflucan 200 mg bid, 6 days of Ceftriaxone continued with 10-14 days of Cefpodoxime. His last Cefpodoxime dose was on 22OCT2022. His Diaz's catheter was changed in the beginning of November at the Urology office and plans to change a new one on 29NOV2022. Denied cough, vomiting, fever, or diarrhea.    In the ED, patient's BP was 185/83 mmHg which decreased to 95/53 mmHg. Patient was given IV  ml bolus. CMP, CBC, PT/INR, aPTT, UA, Urine culure, blood culture, EKG and CXR were obtained. UA is positive for RBCs, WBCs, leukocyte esterase, few bacteria, ketone protein, Hyaline cast and Glucose. Lactate was normal. Patient's Creatinine was 3.36. (2.01 in 2019). EKG showed wide QRS, nonspecific interventricular block and T wave abnormality. Patient is admitted to the Internal Medicine for UTI management.     H&P written by MS4 Trino Nunez and reviewed/revised by PGY-2 Ray [place] Ambida (14 Nov 2022 09:42)    UROLOGY CONSULTATION:  87F with hx of dementia, BPH, CKD (unknown baseline), CVA 2005 and 2017 with residual right sided paralysis, now bed bound, CABG and Aortic Valve repair on coumadin brought to ED, as per documentation patient had catheter placed during prior hospital admission at [facility], unknown reason why. Urine Culture was found to be positive for candida albicans. Pt was brought in to ED after pulling out Diaz Catheter. Diaz catheter was replaced in ED however patient pulled it out again, as per medical team patient is incontinent and had soiled the bed since. Last bladder scan was ~300cc. Pt is non verbal and adequate hx was unable to be obtained. Patient wife Julissa was unable to be reached.     PAST MEDICAL & SURGICAL HISTORY:  CVA (cerebral vascular accident)  (2015 and 2017)  BPH (benign prostatic hyperplasia)  Diabetes  Seizur  HLD (hyperlipidemia)  Unilateral paralysis due to acute cerebrovascular accident (CVA)  Stage 4 chronic kidney disease  Bilateral hydronephrosis  S/P CABG (coronary artery bypass graft)  S/P aortic valve repair  S/P carotid endarterectomy    MEDICATIONS  (STANDING):  atorvastatin 10 milliGRAM(s) Oral at bedtime  fenofibrate Tablet 145 milliGRAM(s) Oral daily  finasteride 5 milliGRAM(s) Oral daily  insulin lispro (ADMELOG) corrective regimen sliding scale   SubCutaneous three times a day before meals  levETIRAcetam 500 milliGRAM(s) Oral two times a day  meropenem  IVPB 500 milliGRAM(s) IV Intermittent every 12 hours  sodium chloride 0.45%. 1000 milliLiter(s) (60 mL/Hr) IV Continuous <Continuous>  tamsulosin 0.4 milliGRAM(s) Oral at bedtime  warfarin 2 milliGRAM(s) Oral once      Allergies    [This allergen will not trigger allergy alert] Originally Entered as [Unknown] reaction to [BEE STING] (Unknown)  No Known Drug Allergies    Intolerances    Vital Signs Last 24 Hrs  T(C): 36.5 (17 Nov 2022 14:18), Max: 36.5 (17 Nov 2022 14:18)  T(F): 97.7 (17 Nov 2022 14:18), Max: 97.7 (17 Nov 2022 14:18)  HR: 65 (17 Nov 2022 14:18) (61 - 81)  BP: 131/63 (17 Nov 2022 14:18) (131/63 - 194/80)  RR: 18 (17 Nov 2022 14:18) (17 - 18)  SpO2: 94% (17 Nov 2022 14:18) (94% - 99%)    Parameters below as of 17 Nov 2022 14:18  Patient On (Oxygen Delivery Method): room air    Physical:  Gen: Awake, NAD  Abd: Firmness of mid/upper abdomen, nontender. Remainder of abdomen soft, ND, NT. No suprapubic tenderness elicited upon palpation.     I&O's Detail    16 Nov 2022 07:01  -  17 Nov 2022 07:00  --------------------------------------------------------  IN:    sodium chloride 0.45%: 720 mL  Total IN: 720 mL    OUT:    Indwelling Catheter - Urethral (mL): 800 mL  Total OUT: 800 mL    Total NET: -80 mL    LABS:                        8.8    4.60  )-----------( 173      ( 17 Nov 2022 09:20 )             28.5              11-17    145  |  114<H>  |  41<H>  ----------------------------<  133<H>  4.6   |  26  |  2.17<H>    Ca    9.2      17 Nov 2022 09:20              PT/INR - ( 16 Nov 2022 05:51 )   PT: 20.9 sec;   INR: 1.75 ratio       HPI:  Patient is dysarthric secondary to CVA and dementia and this history was taken from his wife, Julissa Antunez ([phone], cell: [phone]) who is the care giver of the patient.    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 Patient's Diaz's catheter was placed during his last admission at [facility] for hypoglycemia and Urine culture confirmed Candida albicans UTI. Patient was treated with Diflucan 200 mg bid, 6 days of Ceftriaxone continued with 10-14 days of Cefpodoxime. His last Cefpodoxime dose was on 22OCT2022. His Diaz's catheter was changed in the beginning of November at the Urology office and plans to change a new one on 29NOV2022. Denied cough, vomiting, fever, or diarrhea.    In the ED, patient's BP was 185/83 mmHg which decreased to 95/53 mmHg. Patient was given IV  ml bolus. CMP, CBC, PT/INR, aPTT, UA, Urine culure, blood culture, EKG and CXR were obtained. UA is positive for RBCs, WBCs, leukocyte esterase, few bacteria, ketone protein, Hyaline cast and Glucose. Lactate was normal. Patient's Creatinine was 3.36. (2.01 in 2019). EKG showed wide QRS, nonspecific interventricular block and T wave abnormality. Patient is admitted to the Internal Medicine for UTI management.     H&P written by MS4 Trino Nunez and reviewed/revised by PGY-2 Ray [place] Ambida (14 Nov 2022 09:42)    UROLOGY CONSULTATION:  87F with hx of dementia, BPH, CKD (unknown baseline), CVA 2005 and 2017 with residual right sided paralysis, now bed bound, CABG and Aortic Valve repair on coumadin brought to ED, as per documentation patient had catheter placed during prior hospital admission at [facility], unknown reason why. Urine Culture was found to be positive for candida albicans. Pt was brought in to ED after pulling out Diaz Catheter. Diaz catheter was replaced in ED however patient pulled it out again, as per medical team patient had urinary incontinence and had soiled the bed since self removal. Last bladder scan was ~300cc. Pt is non verbal and adequate hx was unable to be obtained. Patient wife Julissa was unable to be reached.     PAST MEDICAL & SURGICAL HISTORY:  CVA (cerebral vascular accident)  (2015 and 2017)  BPH (benign prostatic hyperplasia)  Diabetes  Seizur  HLD (hyperlipidemia)  Unilateral paralysis due to acute cerebrovascular accident (CVA)  Stage 4 chronic kidney disease  Bilateral hydronephrosis  S/P CABG (coronary artery bypass graft)  S/P aortic valve repair  S/P carotid endarterectomy    MEDICATIONS  (STANDING):  atorvastatin 10 milliGRAM(s) Oral at bedtime  fenofibrate Tablet 145 milliGRAM(s) Oral daily  finasteride 5 milliGRAM(s) Oral daily  insulin lispro (ADMELOG) corrective regimen sliding scale   SubCutaneous three times a day before meals  levETIRAcetam 500 milliGRAM(s) Oral two times a day  meropenem  IVPB 500 milliGRAM(s) IV Intermittent every 12 hours  sodium chloride 0.45%. 1000 milliLiter(s) (60 mL/Hr) IV Continuous <Continuous>  tamsulosin 0.4 milliGRAM(s) Oral at bedtime  warfarin 2 milliGRAM(s) Oral once      Allergies    [This allergen will not trigger allergy alert] Originally Entered as [Unknown] reaction to [BEE STING] (Unknown)  No Known Drug Allergies    Intolerances    Vital Signs Last 24 Hrs  T(C): 36.5 (17 Nov 2022 14:18), Max: 36.5 (17 Nov 2022 14:18)  T(F): 97.7 (17 Nov 2022 14:18), Max: 97.7 (17 Nov 2022 14:18)  HR: 65 (17 Nov 2022 14:18) (61 - 81)  BP: 131/63 (17 Nov 2022 14:18) (131/63 - 194/80)  RR: 18 (17 Nov 2022 14:18) (17 - 18)  SpO2: 94% (17 Nov 2022 14:18) (94% - 99%)    Parameters below as of 17 Nov 2022 14:18  Patient On (Oxygen Delivery Method): room air    Physical:  Gen: Awake, NAD  Abd: Firmness of mid/upper abdomen, nontender. Remainder of abdomen soft, ND, NT. No suprapubic tenderness elicited upon palpation.     I&O's Detail    16 Nov 2022 07:01  -  17 Nov 2022 07:00  --------------------------------------------------------  IN:    sodium chloride 0.45%: 720 mL  Total IN: 720 mL    OUT:    Indwelling Catheter - Urethral (mL): 800 mL  Total OUT: 800 mL    Total NET: -80 mL    LABS:                        8.8    4.60  )-----------( 173      ( 17 Nov 2022 09:20 )             28.5              11-17    145  |  114<H>  |  41<H>  ----------------------------<  133<H>  4.6   |  26  |  2.17<H>    Ca    9.2      17 Nov 2022 09:20              PT/INR - ( 16 Nov 2022 05:51 )   PT: 20.9 sec;   INR: 1.75 ratio

## 2022-11-17 NOTE — CONSULT NOTE ADULT - ASSESSMENT
87M with hx of CKD, BPH, chronic sapp catheter (unknown time or reason  for placement) presenting with hematuria after self inflected traumatic sapp displacement  Cr 2.17 (3.3)  UCx Ecoli 11/14    -Given pt has self removed sapp catheter multiple times and patient is voiding, recommend serial bladder scans every 4 hours. If PVR are >500cc, recommend replacement of sapp catheter.  -Monitor Cr  -Discussed with Dr. Hernandez

## 2022-11-17 NOTE — CONSULT NOTE ADULT - ATTENDING COMMENTS
I have seen and examined the patient and agree with the note with the appropriate changes made as needed. I discussed with the patient's wife about his previous need for a sapp and given his low PVRs despite being unable to volitionally void, it appears the risks of sapp placement outweigh its benefits. He can remain without a sapp. They will follow up with me as an outpatient to discuss his progress following discharge as well as options for bladder drainage like CIC that can be done by his wife.

## 2022-11-17 NOTE — PROGRESS NOTE ADULT - PROBLEM SELECTOR PLAN 2
- Patient's creatinine level was 3.36. (2.01 in 2019)  - Per patient's wife, his creatinine level improved after Diaz's catheter placement. Contacted patient's PCP office to get baseline Creatinine but the office is closed Monday. (PCP. Dr. Manoj Chacon, Ph: [phone])  - Dose medication based on CrCl.  - Avoid Nephrotoxins and NSAIDs.  - Improving 3.36>2.5>2.18 - Patient's creatinine level was 3.36. (2.01 in 2019)  - Per patient's wife, his creatinine level improved after Diaz's catheter placement. Contacted patient's PCP office to get baseline Creatinine but the office is closed Monday. (PCP. Dr. Manoj Chacon, Ph: [phone])  - Dose medication based on CrCl.  - Avoid Nephrotoxins and NSAIDs.  - Improving 3.36>2.5>2.18> 2.17

## 2022-11-18 ENCOUNTER — TRANSCRIPTION ENCOUNTER (OUTPATIENT)
Age: 87
End: 2022-11-18

## 2022-11-18 LAB
ANION GAP SERPL CALC-SCNC: 7 MMOL/L — SIGNIFICANT CHANGE UP (ref 5–17)
BUN SERPL-MCNC: 38 MG/DL — HIGH (ref 7–18)
CALCIUM SERPL-MCNC: 9.3 MG/DL — SIGNIFICANT CHANGE UP (ref 8.4–10.5)
CHLORIDE SERPL-SCNC: 111 MMOL/L — HIGH (ref 96–108)
CO2 SERPL-SCNC: 25 MMOL/L — SIGNIFICANT CHANGE UP (ref 22–31)
CREAT SERPL-MCNC: 2.38 MG/DL — HIGH (ref 0.5–1.3)
EGFR: 26 ML/MIN/1.73M2 — LOW
GLUCOSE BLDC GLUCOMTR-MCNC: 100 MG/DL — HIGH (ref 70–99)
GLUCOSE BLDC GLUCOMTR-MCNC: 102 MG/DL — HIGH (ref 70–99)
GLUCOSE BLDC GLUCOMTR-MCNC: 143 MG/DL — HIGH (ref 70–99)
GLUCOSE BLDC GLUCOMTR-MCNC: 215 MG/DL — HIGH (ref 70–99)
GLUCOSE SERPL-MCNC: 142 MG/DL — HIGH (ref 70–99)
HCT VFR BLD CALC: 30.3 % — LOW (ref 39–50)
HGB BLD-MCNC: 9.4 G/DL — LOW (ref 13–17)
INR BLD: 1.64 RATIO — HIGH (ref 0.88–1.16)
MAGNESIUM SERPL-MCNC: 1.7 MG/DL — SIGNIFICANT CHANGE UP (ref 1.6–2.6)
MCHC RBC-ENTMCNC: 27 PG — SIGNIFICANT CHANGE UP (ref 27–34)
MCHC RBC-ENTMCNC: 31 GM/DL — LOW (ref 32–36)
MCV RBC AUTO: 87.1 FL — SIGNIFICANT CHANGE UP (ref 80–100)
NRBC # BLD: 0 /100 WBCS — SIGNIFICANT CHANGE UP (ref 0–0)
PHOSPHATE SERPL-MCNC: 2.1 MG/DL — LOW (ref 2.5–4.5)
PLATELET # BLD AUTO: 179 K/UL — SIGNIFICANT CHANGE UP (ref 150–400)
POTASSIUM SERPL-MCNC: 5 MMOL/L — SIGNIFICANT CHANGE UP (ref 3.5–5.3)
POTASSIUM SERPL-SCNC: 5 MMOL/L — SIGNIFICANT CHANGE UP (ref 3.5–5.3)
PROTHROM AB SERPL-ACNC: 19.6 SEC — HIGH (ref 10.5–13.4)
RBC # BLD: 3.48 M/UL — LOW (ref 4.2–5.8)
RBC # FLD: 14.8 % — HIGH (ref 10.3–14.5)
SODIUM SERPL-SCNC: 143 MMOL/L — SIGNIFICANT CHANGE UP (ref 135–145)
WBC # BLD: 6 K/UL — SIGNIFICANT CHANGE UP (ref 3.8–10.5)
WBC # FLD AUTO: 6 K/UL — SIGNIFICANT CHANGE UP (ref 3.8–10.5)

## 2022-11-18 PROCEDURE — 99222 1ST HOSP IP/OBS MODERATE 55: CPT | Mod: FS

## 2022-11-18 RX ORDER — CIPROFLOXACIN LACTATE 400MG/40ML
1 VIAL (ML) INTRAVENOUS
Qty: 10 | Refills: 0
Start: 2022-11-18 | End: 2022-11-22

## 2022-11-18 RX ORDER — ACETAMINOPHEN 500 MG
650 TABLET ORAL EVERY 6 HOURS
Refills: 0 | Status: DISCONTINUED | OUTPATIENT
Start: 2022-11-18 | End: 2022-11-19

## 2022-11-18 RX ORDER — AMOXICILLIN 250 MG/5ML
1 SUSPENSION, RECONSTITUTED, ORAL (ML) ORAL
Qty: 15 | Refills: 0
Start: 2022-11-18 | End: 2022-11-22

## 2022-11-18 RX ORDER — WARFARIN SODIUM 2.5 MG/1
2 TABLET ORAL ONCE
Refills: 0 | Status: COMPLETED | OUTPATIENT
Start: 2022-11-18 | End: 2022-11-18

## 2022-11-18 RX ORDER — SENNA PLUS 8.6 MG/1
2 TABLET ORAL AT BEDTIME
Refills: 0 | Status: DISCONTINUED | OUTPATIENT
Start: 2022-11-18 | End: 2022-11-19

## 2022-11-18 RX ORDER — POLYETHYLENE GLYCOL 3350 17 G/17G
17 POWDER, FOR SOLUTION ORAL DAILY
Refills: 0 | Status: DISCONTINUED | OUTPATIENT
Start: 2022-11-18 | End: 2022-11-19

## 2022-11-18 RX ADMIN — MEROPENEM 100 MILLIGRAM(S): 1 INJECTION INTRAVENOUS at 19:47

## 2022-11-18 RX ADMIN — Medication 650 MILLIGRAM(S): at 06:29

## 2022-11-18 RX ADMIN — Medication 650 MILLIGRAM(S): at 07:29

## 2022-11-18 RX ADMIN — WARFARIN SODIUM 2 MILLIGRAM(S): 2.5 TABLET ORAL at 22:40

## 2022-11-18 RX ADMIN — Medication 145 MILLIGRAM(S): at 11:54

## 2022-11-18 RX ADMIN — Medication 2: at 11:54

## 2022-11-18 RX ADMIN — SODIUM CHLORIDE 60 MILLILITER(S): 9 INJECTION, SOLUTION INTRAVENOUS at 18:31

## 2022-11-18 RX ADMIN — LEVETIRACETAM 500 MILLIGRAM(S): 250 TABLET, FILM COATED ORAL at 06:30

## 2022-11-18 RX ADMIN — LEVETIRACETAM 500 MILLIGRAM(S): 250 TABLET, FILM COATED ORAL at 18:31

## 2022-11-18 RX ADMIN — ATORVASTATIN CALCIUM 10 MILLIGRAM(S): 80 TABLET, FILM COATED ORAL at 22:40

## 2022-11-18 RX ADMIN — TAMSULOSIN HYDROCHLORIDE 0.4 MILLIGRAM(S): 0.4 CAPSULE ORAL at 22:40

## 2022-11-18 RX ADMIN — MEROPENEM 100 MILLIGRAM(S): 1 INJECTION INTRAVENOUS at 06:30

## 2022-11-18 RX ADMIN — FINASTERIDE 5 MILLIGRAM(S): 5 TABLET, FILM COATED ORAL at 11:54

## 2022-11-18 NOTE — PROGRESS NOTE ADULT - PROBLEM SELECTOR PLAN 4
Patient is bed bound and wheelchair bound.  Pressure ulcer precaution  Patient was evaluated for speech and swallow at [facility] in October: Recommended Dysphagia solid level 6 soft/bite sized food and mildly thick liquid level 2.     Continued with Pravastatin 20 mg
Patient is bed bound and wheelchair bound.  Pressure ulcer precaution  Patient was evaluated for speech and swallow at [facility] in October: Recommended Dysphagia solid level 6 soft/bite sized food and mildly thick liquid level 2.     Continued with Pravastatin 20 mg
Patient is bed bound and wheelchair bound.  Pressure ulcer precaution  Patient was evaluated for speech and swallow at [facility] in October: Recommended Dysphagia solid level 6 soft/bite sized food and mildly thick liquid level 2.     Continued with Pravastatin 20 mg
Patient is bed bound and wheelchair bound.  Pressure ulcer precaution  Patient was evaluated for speech and swallow at [facility] in October: Recommended Dysphagia solid level 6 soft/bite sized food and mildly thick liquid level 2.     Continued with Pravastatin 20 mg
Patient is bed bound and wheelchair bound.  Pressure ulcer precaution  Patient was evaluated for speech and swallow at [facility] in October: Recommended Dysphagia solid level 6 soft/bite sized food and mildly thick liquid level 2.     Continued with Pravastatin 20 mg

## 2022-11-18 NOTE — PROGRESS NOTE ADULT - ASSESSMENT
UTI    Plan - decrease Cipro to 200mgs iv q12hrs  Start vanco 1 gm iv x 1 dose   UTI  Fever - low grade    Plan - was switched to Meropenem 500mgs iv q12hrs   UTI  Fever - low grade    Plan - was switched to Meropenem 500mgs iv q12hrs  can DC home on Cipro 250mgs po bid and amoxicillin 500mgs po TID both for 5 days.

## 2022-11-18 NOTE — PROGRESS NOTE ADULT - PROBLEM SELECTOR PLAN 7
Home medication: Fenofibrate 145 mg and Pravastatin 20 mg  Continued with home medications

## 2022-11-18 NOTE — PROGRESS NOTE ADULT - PROBLEM SELECTOR PLAN 5
Home medication: Levetiracetam (Keppra) 500 mg bid  Continued with home medication

## 2022-11-18 NOTE — DISCHARGE NOTE PROVIDER - HOSPITAL COURSE
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    Hematuria  Patient came into the hospital after he removed his sapp cath.  Patient has had a chronic cath for several weeks and has had several episodes of pulling out his sapp causing both UTI and hematuria. He was seen by Urology Dr. Hernandez whos recommendations were followed.  Patient passed trial of void, continued to urinate.  bladder scans were preformed q4 and than q8 hours and he continued to retain less than 400-500cc.  He also no longer having episodes hematuria during his hospital stay.    UTI  patient was found to have a UTI on admission. Patient had a chronic which was changed in the ed and eventually removed.  Patient has a history of candida UTI.  Infection diease Dr. Alva was consulted and his recommendations were followed.  He was treated with IV Meropenum.      JORDAN on CKD  Patient has a history of CKD with a baseline creatine of 2.01 from 2019.  on admission his creatine was found to be 3.36.  He was treated with IV fluids and his creatine decreased to 2.38.   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    Hematuria  Patient came into the hospital after he removed his sapp cath.  Patient has had a chronic cath for several weeks and has had several episodes of pulling out his sapp causing both UTI and hematuria. He was seen by Urology Dr. Hernandez whos recommendations were followed.  Patient passed trial of void, continued to urinate.  bladder scans were preformed q4 and than q8 hours and he continued to retain less than 400-500cc.  He also no longer having episodes hematuria during his hospital stay. Pt to follow up with his outpt urologist for further management of urinary retention.    UTI  patient was found to have a UTI on admission. Patient had a chronic which was changed in the ed and eventually removed.  Patient has a history of candida UTI.  Infection diease Dr. Monte was consulted and his recommendations were followed.  He was treated with IV Meropenem. Pt to be switched Cipro 250 bid x5 days + amoxicillin 500 tid x 5 more days.    JORDAN on CKD  Patient has a history of CKD with a baseline creatine of 2.01 from 2019.  on admission his creatine was found to be 3.36.  He was treated with IV fluids and his creatine decreased to 2.38.    Patient is able to ambulate and tolerate diet prior to discharge. Patient is stable for discharge per attending and is advised to follow up with PCP as outpatient. Please refer to patient's complete medical chart with documents for a full hospital course, for this is only a brief summary.

## 2022-11-18 NOTE — PROGRESS NOTE ADULT - SUBJECTIVE AND OBJECTIVE BOX
Subjective  Patient is non-verbal. No acute events overnight. Most recent bladder scan this morning was 343cc.    Objective    Vital signs  T(F): , Max: 100.4 (11-18-22 @ 05:48)  HR: 94 (11-18-22 @ 05:48)  BP: 139/63 (11-18-22 @ 05:48)  SpO2: 95% (11-18-22 @ 05:48)  Wt(kg): --    Output       Gen: NAD  Abd: soft, nontender, nondistended  : voiding    Labs      11-18 @ 05:51    WBC 6.00  / Hct 30.3  / SCr 2.38     11-17 @ 09:20    WBC 4.60  / Hct 28.5  / SCr 2.17         Culture - Urine (collected 11-14-22 @ 06:42)  Source: Clean Catch Clean Catch (Midstream)  Final Report (11-17-22 @ 13:11):    >100,000 CFU/ml Escherichia coli    >100,000 CFU/ml Enterococcus faecalis  Organism: Escherichia coli  Enterococcus faecalis (11-17-22 @ 13:11)  Organism: Enterococcus faecalis (11-17-22 @ 13:11)      -  Ampicillin: S <=2 Predicts results to ampicillin/sulbactam, amoxacillin-clavulanate and  piperacillin-tazobactam.      -  Ciprofloxacin: R >2      -  Levofloxacin: R >4      -  Nitrofurantoin: S <=32 Should not be used to treat pyelonephritis.      -  Tetracycline: R >8      -  Vancomycin: S 2      Method Type: KEVIN  Organism: Escherichia coli (11-17-22 @ 13:11)      -  Amikacin: S <=16      -  Amoxicillin/Clavulanic Acid: R >16/8      -  Ampicillin: R >16 These ampicillin results predict results for amoxicillin      -  Ampicillin/Sulbactam: S 8/4 Enterobacter, Klebsiella aerogenes, Citrobacter, and Serratia may develop resistance during prolonged therapy (3-4 days)      -  Aztreonam: S <=4      -  Cefazolin: R >16 For uncomplicated UTI with K. pneumoniae, E. coli, or P. mirablis: KEVIN <=16 is sensitive and KEVIN >=32 is resistant. This also predicts results for oral agents cefaclor, cefdinir, cefpodoxime, cefprozil, cefuroxime axetil, cephalexin and locarbef for uncomplicated UTI. Note that some isolates may be susceptible to these agents while testing resistant to cefazolin.      -  Cefepime: S <=2      -  Cefoxitin: I 16      -  Ceftriaxone: R 32 Enterobacter, Klebsiella aerogenes, Citrobacter, and Serratia may develop resistance during prolonged therapy      -  Ciprofloxacin: S <=0.25      -  Ertapenem: S <=0.5      -  Gentamicin: S <=2      -  Imipenem: S <=1      -  Levofloxacin: S <=0.5      -  Meropenem: S <=1      -  Nitrofurantoin: S <=32 Should not be used to treat pyelonephritis      -  Piperacillin/Tazobactam: S <=8      -  Tobramycin: S <=2      -  Trimethoprim/Sulfamethoxazole: S <=0.5/9.5      Method Type: KEVIN    Culture - Blood (collected 11-14-22 @ 04:20)  Source: .Blood Blood-Peripheral  Preliminary Report (11-15-22 @ 09:01):    No growth to date.    Culture - Blood (collected 11-14-22 @ 04:10)  Source: .Blood Blood-Peripheral  Preliminary Report (11-15-22 @ 09:01):    No growth to date.

## 2022-11-18 NOTE — PROGRESS NOTE ADULT - PROBLEM SELECTOR PLAN 8
history of AVR in 2013  Continued with Warfarin  Monitor INR (Goal 2-3)
history of AVR in 2013  Continued with Warfarin  Monitor INR (Goal 2-3)
history of AVR in 2013  Monitor INR (Goal 2-3)  Will give coumadin 3mg today  Today INR 1.49, will not do heparin bridge due to inc age/inc risk of bleed.
history of AVR in 2013  Monitor INR (Goal 2-3)  Will give coumadin 3mg today  Today INR 1.49, will not do heparin bridge due to inc age/inc risk of bleed.
history of AVR in 2013  Continued with Warfarin  Monitor INR (Goal 2-3)

## 2022-11-18 NOTE — PROGRESS NOTE ADULT - ATTENDING COMMENTS
I have seen and examined the patient and agree with the note with appropriate changes made if needed.

## 2022-11-18 NOTE — DISCHARGE NOTE PROVIDER - NSDCMRMEDTOKEN_GEN_ALL_CORE_FT
ergocalciferol 1.25 mg (50,000 intl units) oral capsule: 1 cap(s) orally once a week  fenofibrate 145 mg oral tablet: 1 tab(s) orally once a day  ferrous sulfate 325 mg (65 mg elemental iron) oral tablet: 1 tab(s) orally once a day  Flomax 0.4 mg oral capsule: 1 cap(s) orally once a day  folic acid 1 mg oral tablet: 1 tab(s) orally once a day  Keppra 500 mg oral tablet: 1 tab(s) orally 2 times a day  Lantus 100 units/mL subcutaneous solution: international unit(s) subcutaneous once a day  MiraLax oral powder for reconstitution: 17 gram(s) orally once a day, As Needed  Pepcid 20 mg oral tablet: 1 tab(s) orally once a day  pravastatin 20 mg oral tablet: 1 tab(s) orally once a day  Proscar 5 mg oral tablet: 1 tab(s) orally once a day  Senna 8.6 mg oral tablet: 2 tab(s) orally 2 times a day  warfarin 2 mg oral tablet: 1 tab(s) orally once a day   amoxicillin 500 mg oral tablet: 1 tab(s) orally 3 times a day   Cipro 250 mg oral tablet: 1 tab(s) orally 2 times a day   ergocalciferol 1.25 mg (50,000 intl units) oral capsule: 1 cap(s) orally once a week  fenofibrate 145 mg oral tablet: 1 tab(s) orally once a day  ferrous sulfate 325 mg (65 mg elemental iron) oral tablet: 1 tab(s) orally once a day  Flomax 0.4 mg oral capsule: 1 cap(s) orally once a day  folic acid 1 mg oral tablet: 1 tab(s) orally once a day  Keppra 500 mg oral tablet: 1 tab(s) orally 2 times a day  Lantus 100 units/mL subcutaneous solution: international unit(s) subcutaneous once a day  MiraLax oral powder for reconstitution: 17 gram(s) orally once a day, As Needed  Pepcid 20 mg oral tablet: 1 tab(s) orally once a day  pravastatin 20 mg oral tablet: 1 tab(s) orally once a day  Proscar 5 mg oral tablet: 1 tab(s) orally once a day  Senna 8.6 mg oral tablet: 2 tab(s) orally 2 times a day  warfarin 2 mg oral tablet: 1 tab(s) orally once a day   ergocalciferol 1.25 mg (50,000 intl units) oral capsule: 1 cap(s) orally once a week  fenofibrate 145 mg oral tablet: 1 tab(s) orally once a day  ferrous sulfate 325 mg (65 mg elemental iron) oral tablet: 1 tab(s) orally once a day  Flomax 0.4 mg oral capsule: 1 cap(s) orally once a day  folic acid 1 mg oral tablet: 1 tab(s) orally once a day  insulin glargine 100 units/mL subcutaneous solution: 12 unit(s) subcutaneous once a day (at bedtime)   Keppra 500 mg oral tablet: 1 tab(s) orally 2 times a day  MiraLax oral powder for reconstitution: 17 gram(s) orally once a day, As Needed  Pepcid 20 mg oral tablet: 1 tab(s) orally once a day  pravastatin 20 mg oral tablet: 1 tab(s) orally once a day  Proscar 5 mg oral tablet: 1 tab(s) orally once a day  Senna 8.6 mg oral tablet: 2 tab(s) orally 2 times a day  warfarin 2 mg oral tablet: 1 tab(s) orally once a day

## 2022-11-18 NOTE — PROGRESS NOTE ADULT - PROBLEM SELECTOR PROBLEM 4
Unilateral paralysis due to acute cerebrovascular accident (CVA)

## 2022-11-18 NOTE — PROGRESS NOTE ADULT - SUBJECTIVE AND OBJECTIVE BOX
87y Male    Meds:  meropenem  IVPB 500 milliGRAM(s) IV Intermittent every 12 hours    Allergies    [This allergen will not trigger allergy alert] Originally Entered as [Unknown] reaction to [BEE STING] (Unknown)  No Known Drug Allergies    Intolerances        VITALS:  Vital Signs Last 24 Hrs  T(C): 36.8 (18 Nov 2022 13:15), Max: 38 (18 Nov 2022 05:48)  T(F): 98.3 (18 Nov 2022 13:15), Max: 100.4 (18 Nov 2022 05:48)  HR: 73 (18 Nov 2022 13:15) (73 - 94)  BP: 94/54 (18 Nov 2022 13:15) (94/54 - 179/78)  BP(mean): --  RR: 18 (18 Nov 2022 13:15) (18 - 19)  SpO2: 96% (18 Nov 2022 13:15) (95% - 96%)    Parameters below as of 18 Nov 2022 13:15  Patient On (Oxygen Delivery Method): room air        LABS/DIAGNOSTIC TESTS:                          9.4    6.00  )-----------( 179      ( 18 Nov 2022 05:51 )             30.3         11-18    143  |  111<H>  |  38<H>  ----------------------------<  142<H>  5.0   |  25  |  2.38<H>    Ca    9.3      18 Nov 2022 05:51  Phos  2.1     11-18  Mg     1.7     11-18            CULTURES: Clean Catch Clean Catch (Midstream)  11-14 @ 06:42   >100,000 CFU/ml Escherichia coli  >100,000 CFU/ml Enterococcus faecalis  --  Escherichia coli  Enterococcus faecalis      .Blood Blood-Peripheral  11-14 @ 04:20   No growth to date.  --  --      .Blood Blood-Peripheral  11-14 @ 04:10   No growth to date.  --  --            RADIOLOGY:      ROS:  [  ] UNABLE TO ELICIT 87y Male who remains confused and not talking, he is not combative at this time. He  had a low grade fever to 100.4, he was for discharge but has not gone yet.    Meds:  meropenem  IVPB 500 milliGRAM(s) IV Intermittent every 12 hours    Allergies    [This allergen will not trigger allergy alert] Originally Entered as [Unknown] reaction to [BEE STING] (Unknown)  No Known Drug Allergies    Intolerances        VITALS:  Vital Signs Last 24 Hrs  T(C): 36.8 (18 Nov 2022 13:15), Max: 38 (18 Nov 2022 05:48)  T(F): 98.3 (18 Nov 2022 13:15), Max: 100.4 (18 Nov 2022 05:48)  HR: 73 (18 Nov 2022 13:15) (73 - 94)  BP: 94/54 (18 Nov 2022 13:15) (94/54 - 179/78)  BP(mean): --  RR: 18 (18 Nov 2022 13:15) (18 - 19)  SpO2: 96% (18 Nov 2022 13:15) (95% - 96%)    Parameters below as of 18 Nov 2022 13:15  Patient On (Oxygen Delivery Method): room air        LABS/DIAGNOSTIC TESTS:                          9.4    6.00  )-----------( 179      ( 18 Nov 2022 05:51 )             30.3         11-18    143  |  111<H>  |  38<H>  ----------------------------<  142<H>  5.0   |  25  |  2.38<H>    Ca    9.3      18 Nov 2022 05:51  Phos  2.1     11-18  Mg     1.7     11-18            CULTURES: Clean Catch Clean Catch (Midstream)  11-14 @ 06:42   >100,000 CFU/ml Escherichia coli  >100,000 CFU/ml Enterococcus faecalis  --  Escherichia coli  Enterococcus faecalis      .Blood Blood-Peripheral  11-14 @ 04:20   No growth to date.  --  --      .Blood Blood-Peripheral  11-14 @ 04:10   No growth to date.  --  --            RADIOLOGY:      ROS:  [ x ] UNABLE TO ELICIT

## 2022-11-18 NOTE — PROGRESS NOTE ADULT - ASSESSMENT
87M with hx of CKD, BPH, chronic sapp catheter (unknown time or reason  for placement) presenting with hematuria after self inflected traumatic sapp displacement  Cr 2.17 (3.3) --> 2.38 this AM  UCx Ecoli 11/14 - on abx    -Given pt has self removed sapp catheter multiple times and patient is voiding, recommend serial bladder scans every 4 hours. If PVR are >500cc, recommend replacement of sapp catheter.  -Bladder scan 343cc this AM  -Monitor Cr 87M with hx of CKD, BPH, chronic sapp catheter (unknown time or reason  for placement) presenting with hematuria after self inflected traumatic sapp displacement  Cr 2.17 (3.3) --> 2.38 this AM  UCx Ecoli 11/14 - on abx    -Given pt has self removed sapp catheter multiple times and patient is voiding, recommend serial bladder scans every 8 hours. If PVR are >500cc, recommend replacement of sapp catheter.  -Bladder scan 343cc this AM  -Monitor Cr

## 2022-11-18 NOTE — DISCHARGE NOTE PROVIDER - NSDCCPCAREPLAN_GEN_ALL_CORE_FT
PRINCIPAL DISCHARGE DIAGNOSIS  Diagnosis: Acute UTI  Assessment and Plan of Treatment: You were found to have an urinary tract infection. Your initial labs showed positive urinalysis, and positive Urine culture.  This was likley due in part to the removal of your Wolf cath.  Urinary tract infections can cause weakness, confusion, or spread to other organ systems in the body.  You were seen by an infectious diease Dr. Alva while you were in the hospital and their recommendations were followed.  You were started on IV MEROPENUM which is an antibiotics in the hospital with the goal of clearing this infection then transitioned to oral antibiotics.   PLEASE START TAKING CIPRO 250MG ONE TABLET TWO TIMES A DAY FOR 5 DAYS AND AMOXICLLIN 500MG ONE TABLET THREE TIMES A DAY FOR 5 DAYS.  PLEASE FOLLOW UP WITH YOUR PCP AND UROLOGIST WITHIN 1-2 WEEKS FROM       SECONDARY DISCHARGE DIAGNOSES  Diagnosis: Hematuria  Assessment and Plan of Treatment: You were found to have blood in your urine which was likely due to the removal of your wolf cath prior to entering the hospital, this is call hematuria.  While you were in the hospital you were seen by Urology Dr. Hernandez and their recommendations were followed.  You intially had your folley cath replaced in The emergancy department however you were able to urinate without the wolf while you were in the hospital so your wolf was removed.    PLEASE FOLLOW UP WITH YOUR PCP AND UROLOGIST WITHIN 1 WEEK FROM DISCHARGE TO ENSURE YOU ARE STILL URINATING WELL WITHOUT THE NEED FOR A WOLF.  IF YOU BEGIN TO NOTICE BLOOD IN YOUR URINE PLEASE GO TO THE EMERGANCY ROOM IMMEDIATLY.    Diagnosis: Acute kidney injury superimposed on CKD  Assessment and Plan of Treatment: You were diagnosed with acute kidney injury superimposed on known chronic kidney disease. Your creatinine was found to be elevated around 3.36. You were treated with IV fluids untill it normalized to near your baseline. You are recommended to follow up with your PCP and Nephrologist in a week from discharge.     PRINCIPAL DISCHARGE DIAGNOSIS  Diagnosis: Acute UTI  Assessment and Plan of Treatment: You were found to have an urinary tract infection. Your initial labs showed positive urinalysis, and positive Urine culture.  This was likley due in part to the removal of your Wolf cath.  Urinary tract infections can cause weakness, confusion, or spread to other organ systems in the body.  You were seen by an infectious diease Dr. Monte while you were in the hospital and their recommendations were followed.  You were started on IV MEROPENEM which is an antibiotics in the hospital with the goal of clearing this infection then transitioned to oral antibiotics.   PLEASE START TAKING CIPRO 250MG ONE TABLET TWO TIMES A DAY FOR 5 DAYS AND AMOXICLLIN 500MG ONE TABLET THREE TIMES A DAY FOR 5 DAYS UNTIL 11/22.  PLEASE FOLLOW UP WITH YOUR PCP AND UROLOGIST WITHIN 1-2 WEEKS FROM       SECONDARY DISCHARGE DIAGNOSES  Diagnosis: Hematuria  Assessment and Plan of Treatment: You were found to have blood in your urine which was likely due to the removal of your wolf cath prior to entering the hospital, this is call hematuria.  While you were in the hospital you were seen by Urology Dr. Hernandez and their recommendations were followed.  You intially had your folley cath replaced in The emergancy department however you were able to urinate without the wolf while you were in the hospital so your wolf was removed.    PLEASE FOLLOW UP WITH YOUR PCP AND UROLOGIST WITHIN 1 WEEK FROM DISCHARGE TO ENSURE YOU ARE STILL URINATING WELL WITHOUT THE NEED FOR A WOLF.  IF YOU BEGIN TO NOTICE BLOOD IN YOUR URINE PLEASE GO TO THE EMERGANCY ROOM IMMEDIATLY.    Diagnosis: Acute kidney injury superimposed on CKD  Assessment and Plan of Treatment: You were diagnosed with acute kidney injury superimposed on known chronic kidney disease. Your creatinine was found to be elevated around 3.36. You were treated with IV fluids untill it normalized to near your baseline. You are recommended to follow up with your PCP and Nephrologist in a week from discharge.

## 2022-11-18 NOTE — PROGRESS NOTE ADULT - TIME BILLING
- Review of records, telemetry, vital signs and daily labs.   - General and cardiovascular physical examination.  - Generation of cardiovascular treatment plan.  - Coordination of care.      Patient was seen and examined by me on 11/15/22,interim events noted,labs and radiology studies reviewed.  Claude Dorsey MD,FACC.  [address] [place], [place] [id number].  [phone]
- Review of records, telemetry, vital signs and daily labs.   - General and cardiovascular physical examination.  - Generation of cardiovascular treatment plan.  - Coordination of care.      Patient was seen and examined by me on 11/18/22,interim events noted,labs and radiology studies reviewed.  Claude Dorsey MD,FACC.  [address] [place], [place] [id number].  [phone]
- Review of records, telemetry, vital signs and daily labs.   - General and cardiovascular physical examination.  - Generation of cardiovascular treatment plan.  - Coordination of care.      Patient was seen and examined by me on 11/16/22,interim events noted,labs and radiology studies reviewed.  Claude Dorsey MD,FACC.  [address] [place], [place] [id number].  [phone]
- Review of records, telemetry, vital signs and daily labs.   - General and cardiovascular physical examination.  - Generation of cardiovascular treatment plan.  - Coordination of care.      Patient was seen and examined by me on 11/17/22,interim events noted,labs and radiology studies reviewed.  Claude Dorsey MD,FACC.  [address] [place], [place] [id number].  [phone]

## 2022-11-18 NOTE — PROGRESS NOTE ADULT - PROBLEM SELECTOR PROBLEM 8
H/O aortic valve replacement

## 2022-11-18 NOTE — PROGRESS NOTE ADULT - PROBLEM SELECTOR PLAN 3
Home medication: Finasteride 5 mg and Tamsulosin 0.4 mg  Continue with home medication

## 2022-11-18 NOTE — PROGRESS NOTE ADULT - PROBLEM SELECTOR PLAN 9
DVT prophylaxis: currently on Warfarin for AVR

## 2022-11-18 NOTE — PROGRESS NOTE ADULT - PROBLEM SELECTOR PLAN 1
- Patient has recent history of Candida albicans UTI in October and was treated with Diflucan, as well as Ceftriaxone and Cefpodoxime.  - Patient is afebrile.  - Rigid hard abdomen on umbilical and suprapubic areas, concerning for stool impaction or masses. Consider obtaining imaging.  - WBC 3.39.   - UA is positive for RBCs, WBCs, leukocyte esterase in ED. RBCs is likely from urethral injury from self removal of Sapp's.  - Pending blood cultures.   - Urine Cultures - E. Coli and Enteroccoi  - E. Coli resistance to ceftriaxone  - Start on Meropenum Per ID  - 1/ 3 on SIR criteria and 1-2 points on qSOFA (as cannot access patient's mental status)   - change antibiotics accordingly based on the culture and sensitivity  - F/U Bladder scan  - Consult ID Dr. Alva  - Consulted Urology - possible sapp replacement - Patient has recent history of Candida albicans UTI in October and was treated with Diflucan, as well as Ceftriaxone and Cefpodoxime.  - Patient is afebrile.  - Rigid hard abdomen on umbilical and suprapubic areas, concerning for stool impaction or masses. Consider obtaining imaging.  - WBC 3.39.   - UA is positive for RBCs, WBCs, leukocyte esterase in ED. 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  - Pending blood cultures.   - Urine Cultures - E. Coli and Enteroccoi  - E. Coli resistance to ceftriaxone  - Continue on Meropenum Per ID  - 1/ 3 on SIR criteria and 1-2 points on qSOFA (as cannot access patient's mental status)   - change antibiotics accordingly based on the culture and sensitivity  - F/U Bladder scan Q8 per urology  - Consult ID Dr. Alva  - Consulted Urology

## 2022-11-18 NOTE — PROGRESS NOTE ADULT - PROBLEM SELECTOR PLAN 2
- Patient's creatinine level was 3.36. (2.01 in 2019)  - Per patient's wife, his creatinine level improved after Diaz's catheter placement. Contacted patient's PCP office to get baseline Creatinine but the office is closed Monday. (PCP. Dr. Manoj Chacon, Ph: [phone])  - Dose medication based on CrCl.  - Avoid Nephrotoxins and NSAIDs.  - Improving 3.36>2.5>2.18> 2.17 - Patient's creatinine level was 3.36. (2.01 in 2019)  - Per patient's wife, his creatinine level improved after Diaz's catheter placement. Contacted patient's PCP office to get baseline Creatinine but the office is closed Monday. (PCP. Dr. Manoj Chacon, Ph: [phone])  - Dose medication based on CrCl.  - Avoid Nephrotoxins and NSAIDs.  - Improving 3.36>2.5>2.18> 2.17 > 2.38

## 2022-11-18 NOTE — CHART NOTE - NSCHARTNOTEFT_GEN_A_CORE
Paged by nurse for elevated /102 mmHg, repeat BP in an hour is 179/88 mmHg. Patient was given 5mg of Amlodipine.

## 2022-11-18 NOTE — PROGRESS NOTE ADULT - PROBLEM SELECTOR PLAN 6
On lantus at home  Serum glucose 311 in ED  Now improved, dec appetite, will hold lantus and do admelog sliding scale until appetite improved
On Lantus at home  Serum glucose 311 in ED.  Monitor glucose with daily BMP.  Continued with Lantus
On lantus at home  Serum glucose 311 in ED  Now improved, dec appetite, will hold lantus and do admelog sliding scale until appetite improved
On Lantus at home  Serum glucose 311 in ED.  Monitor glucose with daily BMP.  Continued with Lantus
On Lantus at home  Serum glucose 311 in ED.  Monitor glucose with daily BMP.  Continued with Lantus

## 2022-11-18 NOTE — PROGRESS NOTE ADULT - PROVIDER SPECIALTY LIST ADULT
Infectious Disease
Internal Medicine
Urology
Internal Medicine
Cardiology

## 2022-11-19 ENCOUNTER — TRANSCRIPTION ENCOUNTER (OUTPATIENT)
Age: 87
End: 2022-11-19

## 2022-11-19 VITALS
TEMPERATURE: 98 F | OXYGEN SATURATION: 96 % | DIASTOLIC BLOOD PRESSURE: 54 MMHG | RESPIRATION RATE: 18 BRPM | SYSTOLIC BLOOD PRESSURE: 141 MMHG

## 2022-11-19 LAB
CULTURE RESULTS: SIGNIFICANT CHANGE UP
CULTURE RESULTS: SIGNIFICANT CHANGE UP
GLUCOSE BLDC GLUCOMTR-MCNC: 128 MG/DL — HIGH (ref 70–99)
GLUCOSE BLDC GLUCOMTR-MCNC: 99 MG/DL — SIGNIFICANT CHANGE UP (ref 70–99)
SPECIMEN SOURCE: SIGNIFICANT CHANGE UP
SPECIMEN SOURCE: SIGNIFICANT CHANGE UP

## 2022-11-19 PROCEDURE — 81001 URINALYSIS AUTO W/SCOPE: CPT

## 2022-11-19 PROCEDURE — 85610 PROTHROMBIN TIME: CPT

## 2022-11-19 PROCEDURE — 87186 SC STD MICRODIL/AGAR DIL: CPT

## 2022-11-19 PROCEDURE — 80053 COMPREHEN METABOLIC PANEL: CPT

## 2022-11-19 PROCEDURE — 85730 THROMBOPLASTIN TIME PARTIAL: CPT

## 2022-11-19 PROCEDURE — 80048 BASIC METABOLIC PNL TOTAL CA: CPT

## 2022-11-19 PROCEDURE — 36415 COLL VENOUS BLD VENIPUNCTURE: CPT

## 2022-11-19 PROCEDURE — 87635 SARS-COV-2 COVID-19 AMP PRB: CPT

## 2022-11-19 PROCEDURE — 83735 ASSAY OF MAGNESIUM: CPT

## 2022-11-19 PROCEDURE — 82962 GLUCOSE BLOOD TEST: CPT

## 2022-11-19 PROCEDURE — 84100 ASSAY OF PHOSPHORUS: CPT

## 2022-11-19 PROCEDURE — 87077 CULTURE AEROBIC IDENTIFY: CPT

## 2022-11-19 PROCEDURE — 83036 HEMOGLOBIN GLYCOSYLATED A1C: CPT

## 2022-11-19 PROCEDURE — 93005 ELECTROCARDIOGRAM TRACING: CPT

## 2022-11-19 PROCEDURE — 87086 URINE CULTURE/COLONY COUNT: CPT

## 2022-11-19 PROCEDURE — 71045 X-RAY EXAM CHEST 1 VIEW: CPT

## 2022-11-19 PROCEDURE — 87040 BLOOD CULTURE FOR BACTERIA: CPT

## 2022-11-19 PROCEDURE — 86901 BLOOD TYPING SEROLOGIC RH(D): CPT

## 2022-11-19 PROCEDURE — 86900 BLOOD TYPING SEROLOGIC ABO: CPT

## 2022-11-19 PROCEDURE — 83605 ASSAY OF LACTIC ACID: CPT

## 2022-11-19 PROCEDURE — 85027 COMPLETE CBC AUTOMATED: CPT

## 2022-11-19 PROCEDURE — 99285 EMERGENCY DEPT VISIT HI MDM: CPT

## 2022-11-19 PROCEDURE — 85025 COMPLETE CBC W/AUTO DIFF WBC: CPT

## 2022-11-19 PROCEDURE — 86850 RBC ANTIBODY SCREEN: CPT

## 2022-11-19 RX ORDER — FENOFIBRATE,MICRONIZED 130 MG
1 CAPSULE ORAL
Qty: 0 | Refills: 0 | DISCHARGE

## 2022-11-19 RX ORDER — FINASTERIDE 5 MG/1
1 TABLET, FILM COATED ORAL
Qty: 0 | Refills: 0 | DISCHARGE

## 2022-11-19 RX ORDER — POLYETHYLENE GLYCOL 3350 17 G/17G
17 POWDER, FOR SOLUTION ORAL
Qty: 0 | Refills: 0 | DISCHARGE

## 2022-11-19 RX ORDER — TAMSULOSIN HYDROCHLORIDE 0.4 MG/1
1 CAPSULE ORAL
Qty: 0 | Refills: 0 | DISCHARGE

## 2022-11-19 RX ORDER — ERGOCALCIFEROL 1.25 MG/1
1 CAPSULE ORAL
Qty: 0 | Refills: 0 | DISCHARGE

## 2022-11-19 RX ORDER — INSULIN GLARGINE 100 [IU]/ML
0 INJECTION, SOLUTION SUBCUTANEOUS
Qty: 0 | Refills: 0 | DISCHARGE

## 2022-11-19 RX ORDER — FAMOTIDINE 10 MG/ML
1 INJECTION INTRAVENOUS
Qty: 0 | Refills: 0 | DISCHARGE

## 2022-11-19 RX ORDER — FOLIC ACID 0.8 MG
1 TABLET ORAL
Qty: 0 | Refills: 0 | DISCHARGE

## 2022-11-19 RX ORDER — INSULIN GLARGINE 100 [IU]/ML
12 INJECTION, SOLUTION SUBCUTANEOUS
Qty: 10 | Refills: 0
Start: 2022-11-19 | End: 2022-12-18

## 2022-11-19 RX ORDER — WARFARIN SODIUM 2.5 MG/1
1 TABLET ORAL
Qty: 0 | Refills: 0 | DISCHARGE

## 2022-11-19 RX ORDER — SENNA PLUS 8.6 MG/1
2 TABLET ORAL
Qty: 0 | Refills: 0 | DISCHARGE

## 2022-11-19 RX ORDER — FERROUS SULFATE 325(65) MG
1 TABLET ORAL
Qty: 0 | Refills: 0 | DISCHARGE

## 2022-11-19 RX ADMIN — FINASTERIDE 5 MILLIGRAM(S): 5 TABLET, FILM COATED ORAL at 13:48

## 2022-11-19 RX ADMIN — Medication 145 MILLIGRAM(S): at 13:48

## 2022-11-19 RX ADMIN — LEVETIRACETAM 500 MILLIGRAM(S): 250 TABLET, FILM COATED ORAL at 05:50

## 2022-11-19 RX ADMIN — MEROPENEM 100 MILLIGRAM(S): 1 INJECTION INTRAVENOUS at 05:50

## 2022-11-19 NOTE — DISCHARGE NOTE NURSING/CASE MANAGEMENT/SOCIAL WORK - NSDCPEFALRISK_GEN_ALL_CORE
For information on Fall & Injury Prevention, visit: [email or web address] OR  [email or web address] OR  https://www.cdc.gov/steadi/patient.html

## 2022-12-01 PROBLEM — N13.30 UNSPECIFIED HYDRONEPHROSIS: Chronic | Status: ACTIVE | Noted: 2022-11-14

## 2022-12-01 PROBLEM — I63.9 CEREBRAL INFARCTION, UNSPECIFIED: Chronic | Status: ACTIVE | Noted: 2022-11-14

## 2022-12-01 PROBLEM — I63.9 CEREBRAL INFARCTION, UNSPECIFIED: Chronic | Status: ACTIVE | Noted: 2017-06-22

## 2022-12-01 PROBLEM — N18.4 CHRONIC KIDNEY DISEASE, STAGE 4 (SEVERE): Chronic | Status: ACTIVE | Noted: 2022-11-14

## 2022-12-09 ENCOUNTER — APPOINTMENT (OUTPATIENT)
Dept: UROLOGY | Facility: CLINIC | Age: 87
End: 2022-12-09

## 2022-12-09 VITALS
TEMPERATURE: 97.6 F | HEIGHT: 71 IN | DIASTOLIC BLOOD PRESSURE: 57 MMHG | SYSTOLIC BLOOD PRESSURE: 118 MMHG | WEIGHT: 170 LBS | HEART RATE: 60 BPM | OXYGEN SATURATION: 85 % | BODY MASS INDEX: 23.8 KG/M2

## 2022-12-09 DIAGNOSIS — N40.1 BENIGN PROSTATIC HYPERPLASIA WITH LOWER URINARY TRACT SYMPMS: ICD-10-CM

## 2022-12-09 DIAGNOSIS — R39.14 BENIGN PROSTATIC HYPERPLASIA WITH LOWER URINARY TRACT SYMPMS: ICD-10-CM

## 2022-12-09 PROCEDURE — 99213 OFFICE O/P EST LOW 20 MIN: CPT

## 2022-12-09 RX ORDER — TAMSULOSIN HYDROCHLORIDE 0.4 MG/1
0.4 CAPSULE ORAL
Qty: 90 | Refills: 3 | Status: ACTIVE | COMMUNITY
Start: 2022-12-09 | End: 1900-01-01

## 2022-12-09 NOTE — ASSESSMENT
[FreeTextEntry1] : 88 yo male with recent admission for UTI as well as urinary retention. He had pulled his sapp out multiple times and decision was made to start on finasteride and tamsulosin with serial bladder scans between 150-300. The patient has tolerated his medication since discharge and is able to have wet diapers daily. He will continue with his tamsulosin and finasteride and follow up in 6 months to assess voiding symptoms\par \par Plan:\par - C/W Tamsulosin 0.4 mg daily (script refilled) \par - C/W finasteride\par - Follow up in 6 months

## 2022-12-09 NOTE — HISTORY OF PRESENT ILLNESS
[FreeTextEntry1] : 88 yo male with recent hospital admission for UTI. He was admitted to [facility] on month ago and had sapp placed at that time. After discharge he continued with the sapp and was readmitted to [facility] for AMS and found to have candiduria. While in the hospital he had pulled his sapp out which lead to hematuria. He had it replaced but pulled it out again. At that time, he was started on tamsulosin and finasteride and had serial bladder scans done showing between 100-300. Given the scans were not immediately post-void as the patient does not volitionally void, it was determined to continue on medication and monitor for episodes of urinary retention. Since his discharge, he has been doing well. He continues to void daily within his diaper and has not had any episodes of retention. \par \par The patient is here with his wife and care taker who take care of him and ensure he is cleaned daily. He has a history of dementia, ckd, cva x2 with right sided paralysis. He is wheel chair bound. He also has history of CAD s/p CABG.

## 2022-12-09 NOTE — PHYSICAL EXAM
[General Appearance - In No Acute Distress] : no acute distress [Edema] : no peripheral edema [Abdomen Soft] : soft [] : no rash [Not Anxious] : not anxious [Inguinal Lymph Nodes Enlarged Bilaterally] : inguinal [FreeTextEntry1] : wheelchair bound

## 2023-06-09 ENCOUNTER — APPOINTMENT (OUTPATIENT)
Dept: UROLOGY | Facility: CLINIC | Age: 88
End: 2023-06-09

## 2025-07-15 NOTE — DISCHARGE NOTE PROVIDER - NSDCCPCAREPLAN_GEN_ALL_CORE_FT
PRINCIPAL DISCHARGE DIAGNOSIS  Diagnosis: Seizure  Assessment and Plan of Treatment: Patient (pt) presented with loss of consciousness and unresponsive with drooling from mouth and Loss of urine and Post-ictal confusion. Now back to baseline mentation.  Last seizure episode was in 2015. 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 Pt is on Keppra 125mg BID at home. Pt's Keppra dosage increased to 500mg BID given breakthrough seizure on previous regimen. Neurology Dr Han followed the case. EEG performed showed generalized background slowing without active seizure at the time of test. MRI of brain attempted to be performed to rule out new onset stroke, but pt could not be positioned within MRI machine pt's posture.   CT head instead ordered to rule out subacute stroke and it was normal. Pt deemed stable for discharge.         SECONDARY DISCHARGE DIAGNOSES  Diagnosis: Bradycardia  Assessment and Plan of Treatment: EKG showed sinus bradycardia with 1st degree block  Normal cardiac enzymes  Tele monitoring for r/o underlying arrythmia causing syncope  Follow up with your cardiologist as outpatient to perform transthoracic echocardiogram given recent syncopal episode.    Diagnosis: Aortic valve replaced  Assessment and Plan of Treatment: Pt takes Warfarin 4mg for 5 days and 2.5 mg two times per week alternating. You initially came with INR 3.58 so Warfarin has been held. INR lowered to subtherapeutic INR level 2.19. Continue with your regular Warfarin regimen. Follow up with your PCP AS SOON AS POSSIBLE AFTER DISCHARGE to set up appointments for INR level check.
None